# Patient Record
Sex: MALE | Race: WHITE | Employment: OTHER | URBAN - NONMETROPOLITAN AREA
[De-identification: names, ages, dates, MRNs, and addresses within clinical notes are randomized per-mention and may not be internally consistent; named-entity substitution may affect disease eponyms.]

---

## 2017-04-29 ENCOUNTER — HOSPITAL ENCOUNTER (EMERGENCY)
Age: 62
Discharge: HOME | End: 2017-04-29
Attending: FAMILY MEDICINE
Payer: COMMERCIAL

## 2017-04-29 VITALS
DIASTOLIC BLOOD PRESSURE: 68 MMHG | WEIGHT: 225 LBS | HEART RATE: 69 BPM | HEIGHT: 72 IN | RESPIRATION RATE: 20 BRPM | OXYGEN SATURATION: 97 % | SYSTOLIC BLOOD PRESSURE: 122 MMHG | BODY MASS INDEX: 30.48 KG/M2 | TEMPERATURE: 97.9 F

## 2017-04-29 DIAGNOSIS — L03.115 CELLULITIS OF BOTH LOWER EXTREMITIES: Primary | ICD-10-CM

## 2017-04-29 DIAGNOSIS — L03.116 CELLULITIS OF BOTH LOWER EXTREMITIES: Primary | ICD-10-CM

## 2017-04-29 LAB
ABSOLUTE EOS #: 0.1 K/UL (ref 0–0.4)
ABSOLUTE LYMPH #: 1.1 K/UL (ref 0.9–2.5)
ABSOLUTE MONO #: 0.6 K/UL (ref 0–1)
ALBUMIN SERPL-MCNC: 4.1 G/DL (ref 3.5–5.2)
ALBUMIN/GLOBULIN RATIO: ABNORMAL (ref 1–2.5)
ALP BLD-CCNC: 59 U/L (ref 40–129)
ALT SERPL-CCNC: 15 U/L (ref 5–41)
ANION GAP SERPL CALCULATED.3IONS-SCNC: 14 MMOL/L (ref 9–17)
AST SERPL-CCNC: 14 U/L
BASOPHILS # BLD: 0 %
BASOPHILS ABSOLUTE: 0 K/UL (ref 0–0.2)
BILIRUB SERPL-MCNC: 0.25 MG/DL (ref 0.3–1.2)
BUN BLDV-MCNC: 41 MG/DL (ref 8–23)
BUN/CREAT BLD: 23 (ref 9–20)
CALCIUM SERPL-MCNC: 9.5 MG/DL (ref 8.6–10.4)
CHLORIDE BLD-SCNC: 97 MMOL/L (ref 98–107)
CO2: 27 MMOL/L (ref 20–31)
CREAT SERPL-MCNC: 1.79 MG/DL (ref 0.7–1.2)
DIFFERENTIAL TYPE: YES
EOSINOPHILS RELATIVE PERCENT: 1 %
GFR AFRICAN AMERICAN: 47 ML/MIN
GFR NON-AFRICAN AMERICAN: 39 ML/MIN
GFR SERPL CREATININE-BSD FRML MDRD: ABNORMAL ML/MIN/{1.73_M2}
GFR SERPL CREATININE-BSD FRML MDRD: ABNORMAL ML/MIN/{1.73_M2}
GLUCOSE BLD-MCNC: 121 MG/DL (ref 70–99)
HCT VFR BLD CALC: 32.9 % (ref 41–53)
HEMOGLOBIN: 11.4 G/DL (ref 13.5–17.5)
LACTIC ACID, WHOLE BLOOD: NORMAL MMOL/L (ref 0.7–2.1)
LACTIC ACID: 1.1 MMOL/L (ref 0.5–2.2)
LYMPHOCYTES # BLD: 13 %
MCH RBC QN AUTO: 33.4 PG (ref 26–34)
MCHC RBC AUTO-ENTMCNC: 34.8 G/DL (ref 31–37)
MCV RBC AUTO: 96.1 FL (ref 80–100)
MONOCYTES # BLD: 7 %
PDW BLD-RTO: 14.4 % (ref 12.1–15.2)
PLATELET # BLD: 167 K/UL (ref 140–450)
PLATELET ESTIMATE: ABNORMAL
PMV BLD AUTO: ABNORMAL FL (ref 6–12)
POTASSIUM SERPL-SCNC: 4.2 MMOL/L (ref 3.7–5.3)
RBC # BLD: 3.43 M/UL (ref 4.5–5.9)
RBC # BLD: ABNORMAL 10*6/UL
SEG NEUTROPHILS: 79 %
SEGMENTED NEUTROPHILS ABSOLUTE COUNT: 6.7 K/UL (ref 2.1–6.5)
SODIUM BLD-SCNC: 138 MMOL/L (ref 135–144)
TOTAL PROTEIN: 7 G/DL (ref 6.4–8.3)
WBC # BLD: 8.6 K/UL (ref 3.5–11)
WBC # BLD: ABNORMAL 10*3/UL

## 2017-04-29 PROCEDURE — 6360000002 HC RX W HCPCS: Performed by: FAMILY MEDICINE

## 2017-04-29 PROCEDURE — 83605 ASSAY OF LACTIC ACID: CPT

## 2017-04-29 PROCEDURE — 80053 COMPREHEN METABOLIC PANEL: CPT

## 2017-04-29 PROCEDURE — 87070 CULTURE OTHR SPECIMN AEROBIC: CPT

## 2017-04-29 PROCEDURE — 96367 TX/PROPH/DG ADDL SEQ IV INF: CPT

## 2017-04-29 PROCEDURE — 87040 BLOOD CULTURE FOR BACTERIA: CPT

## 2017-04-29 PROCEDURE — 99283 EMERGENCY DEPT VISIT LOW MDM: CPT

## 2017-04-29 PROCEDURE — 2580000003 HC RX 258: Performed by: FAMILY MEDICINE

## 2017-04-29 PROCEDURE — 36415 COLL VENOUS BLD VENIPUNCTURE: CPT

## 2017-04-29 PROCEDURE — 96365 THER/PROPH/DIAG IV INF INIT: CPT

## 2017-04-29 PROCEDURE — 96375 TX/PRO/DX INJ NEW DRUG ADDON: CPT

## 2017-04-29 PROCEDURE — 85025 COMPLETE CBC W/AUTO DIFF WBC: CPT

## 2017-04-29 PROCEDURE — 87205 SMEAR GRAM STAIN: CPT

## 2017-04-29 RX ORDER — HYDROCODONE BITARTRATE AND ACETAMINOPHEN 5; 325 MG/1; MG/1
1 TABLET ORAL EVERY 6 HOURS PRN
Qty: 8 TABLET | Refills: 0 | Status: SHIPPED | OUTPATIENT
Start: 2017-04-29 | End: 2018-04-04 | Stop reason: ALTCHOICE

## 2017-04-29 RX ORDER — SULFAMETHOXAZOLE AND TRIMETHOPRIM 800; 160 MG/1; MG/1
1 TABLET ORAL 2 TIMES DAILY
Qty: 20 TABLET | Refills: 0 | Status: SHIPPED | OUTPATIENT
Start: 2017-04-29 | End: 2017-05-09

## 2017-04-29 RX ORDER — CEPHALEXIN 500 MG/1
500 CAPSULE ORAL 3 TIMES DAILY
Qty: 30 CAPSULE | Refills: 0 | Status: SHIPPED | OUTPATIENT
Start: 2017-04-29 | End: 2018-03-18 | Stop reason: ALTCHOICE

## 2017-04-29 RX ORDER — ONDANSETRON 2 MG/ML
4 INJECTION INTRAMUSCULAR; INTRAVENOUS ONCE
Status: COMPLETED | OUTPATIENT
Start: 2017-04-29 | End: 2017-04-29

## 2017-04-29 RX ADMIN — ONDANSETRON HYDROCHLORIDE 4 MG: 2 INJECTION, SOLUTION INTRAMUSCULAR; INTRAVENOUS at 18:25

## 2017-04-29 RX ADMIN — PIPERACILLIN SODIUM,TAZOBACTAM SODIUM 3.38 G: 3; .375 INJECTION, POWDER, FOR SOLUTION INTRAVENOUS at 20:15

## 2017-04-29 RX ADMIN — HYDROMORPHONE HYDROCHLORIDE 1 MG: 1 INJECTION, SOLUTION INTRAMUSCULAR; INTRAVENOUS; SUBCUTANEOUS at 18:25

## 2017-04-29 RX ADMIN — VANCOMYCIN HYDROCHLORIDE 1000 MG: 1 INJECTION, POWDER, LYOPHILIZED, FOR SOLUTION INTRAVENOUS at 18:58

## 2017-04-29 ASSESSMENT — PAIN DESCRIPTION - PAIN TYPE: TYPE: ACUTE PAIN

## 2017-04-29 ASSESSMENT — PAIN DESCRIPTION - PROGRESSION: CLINICAL_PROGRESSION: NOT CHANGED

## 2017-04-29 ASSESSMENT — PAIN DESCRIPTION - ORIENTATION: ORIENTATION: RIGHT;LEFT;LOWER

## 2017-04-29 ASSESSMENT — PAIN DESCRIPTION - LOCATION: LOCATION: LEG

## 2017-04-29 ASSESSMENT — PAIN DESCRIPTION - DESCRIPTORS: DESCRIPTORS: BURNING;ACHING

## 2017-04-29 ASSESSMENT — PAIN SCALES - GENERAL
PAINLEVEL_OUTOF10: 4
PAINLEVEL_OUTOF10: 6

## 2017-04-29 ASSESSMENT — PAIN DESCRIPTION - FREQUENCY: FREQUENCY: CONTINUOUS

## 2017-05-02 LAB
CULTURE: ABNORMAL
CULTURE: ABNORMAL
DIRECT EXAM: ABNORMAL
DIRECT EXAM: ABNORMAL
Lab: ABNORMAL
SPECIMEN DESCRIPTION: ABNORMAL
SPECIMEN DESCRIPTION: ABNORMAL
STATUS: ABNORMAL

## 2017-05-05 LAB
CULTURE: NORMAL
Lab: NORMAL
Lab: NORMAL
SPECIMEN DESCRIPTION: NORMAL
SPECIMEN DESCRIPTION: NORMAL
STATUS: NORMAL
STATUS: NORMAL

## 2018-03-18 ENCOUNTER — HOSPITAL ENCOUNTER (EMERGENCY)
Age: 63
Discharge: HOME | End: 2018-03-18
Attending: EMERGENCY MEDICINE

## 2018-03-18 VITALS
SYSTOLIC BLOOD PRESSURE: 176 MMHG | TEMPERATURE: 97.4 F | OXYGEN SATURATION: 98 % | RESPIRATION RATE: 20 BRPM | HEART RATE: 72 BPM | DIASTOLIC BLOOD PRESSURE: 82 MMHG

## 2018-03-18 DIAGNOSIS — F10.20 UNCOMPLICATED ALCOHOL DEPENDENCE (HCC): Primary | ICD-10-CM

## 2018-03-18 PROCEDURE — 99283 EMERGENCY DEPT VISIT LOW MDM: CPT

## 2018-03-18 ASSESSMENT — ENCOUNTER SYMPTOMS
DIARRHEA: 0
SHORTNESS OF BREATH: 0
VOMITING: 0
EYE REDNESS: 0
ABDOMINAL PAIN: 0
NAUSEA: 0
BACK PAIN: 0
SORE THROAT: 0
EYE PAIN: 0
COUGH: 0
TROUBLE SWALLOWING: 0
COLOR CHANGE: 0

## 2018-04-04 ENCOUNTER — HOSPITAL ENCOUNTER (EMERGENCY)
Age: 63
Discharge: HOME | End: 2018-04-04
Attending: FAMILY MEDICINE
Payer: MEDICARE

## 2018-04-04 ENCOUNTER — APPOINTMENT (OUTPATIENT)
Dept: GENERAL RADIOLOGY | Age: 63
End: 2018-04-04
Payer: MEDICARE

## 2018-04-04 VITALS
TEMPERATURE: 99.2 F | OXYGEN SATURATION: 93 % | DIASTOLIC BLOOD PRESSURE: 66 MMHG | WEIGHT: 236.9 LBS | SYSTOLIC BLOOD PRESSURE: 129 MMHG | RESPIRATION RATE: 25 BRPM | HEART RATE: 71 BPM | BODY MASS INDEX: 32.13 KG/M2

## 2018-04-04 DIAGNOSIS — J10.1 INFLUENZA B: Primary | ICD-10-CM

## 2018-04-04 DIAGNOSIS — R19.7 DIARRHEA, UNSPECIFIED TYPE: ICD-10-CM

## 2018-04-04 LAB
ABSOLUTE EOS #: ABNORMAL K/UL (ref 0–0.4)
ABSOLUTE IMMATURE GRANULOCYTE: ABNORMAL K/UL (ref 0–0.3)
ABSOLUTE LYMPH #: 0.6 K/UL (ref 1–4.8)
ABSOLUTE MONO #: 0.42 K/UL (ref 0–1)
ALBUMIN SERPL-MCNC: 4.3 G/DL (ref 3.5–5.2)
ALBUMIN/GLOBULIN RATIO: ABNORMAL (ref 1–2.5)
ALP BLD-CCNC: 63 U/L (ref 40–129)
ALT SERPL-CCNC: 17 U/L (ref 5–41)
ANION GAP SERPL CALCULATED.3IONS-SCNC: 16 MMOL/L (ref 9–17)
AST SERPL-CCNC: 25 U/L
BASOPHILS # BLD: ABNORMAL % (ref 0–2)
BASOPHILS ABSOLUTE: ABNORMAL K/UL (ref 0–0.2)
BILIRUB SERPL-MCNC: 0.33 MG/DL (ref 0.3–1.2)
BNP INTERPRETATION: ABNORMAL
BUN BLDV-MCNC: 19 MG/DL (ref 8–23)
BUN/CREAT BLD: 14 (ref 9–20)
CALCIUM SERPL-MCNC: 8.8 MG/DL (ref 8.6–10.4)
CHLORIDE BLD-SCNC: 94 MMOL/L (ref 98–107)
CO2: 26 MMOL/L (ref 20–31)
CREAT SERPL-MCNC: 1.33 MG/DL (ref 0.7–1.2)
DIFFERENTIAL TYPE: ABNORMAL
DIRECT EXAM: ABNORMAL
EKG ATRIAL RATE: 66 BPM
EKG P AXIS: 62 DEGREES
EKG P-R INTERVAL: 154 MS
EKG Q-T INTERVAL: 428 MS
EKG QRS DURATION: 126 MS
EKG QTC CALCULATION (BAZETT): 448 MS
EKG R AXIS: -9 DEGREES
EKG T AXIS: -26 DEGREES
EKG VENTRICULAR RATE: 66 BPM
EOSINOPHILS RELATIVE PERCENT: ABNORMAL % (ref 0–5)
GFR AFRICAN AMERICAN: >60 ML/MIN
GFR NON-AFRICAN AMERICAN: 54 ML/MIN
GFR SERPL CREATININE-BSD FRML MDRD: ABNORMAL ML/MIN/{1.73_M2}
GFR SERPL CREATININE-BSD FRML MDRD: ABNORMAL ML/MIN/{1.73_M2}
GLUCOSE BLD-MCNC: 90 MG/DL (ref 70–99)
HCT VFR BLD CALC: 44.3 % (ref 41–53)
HEMOGLOBIN: 15.3 G/DL (ref 13.5–17.5)
IMMATURE GRANULOCYTES: ABNORMAL %
LYMPHOCYTES # BLD: 23 % (ref 13–44)
Lab: ABNORMAL
MCH RBC QN AUTO: 32.8 PG (ref 26–34)
MCHC RBC AUTO-ENTMCNC: 34.4 G/DL (ref 31–37)
MCV RBC AUTO: 95.2 FL (ref 80–100)
MONOCYTES # BLD: 16 % (ref 5–9)
MORPHOLOGY: ABNORMAL
NRBC AUTOMATED: ABNORMAL PER 100 WBC
PDW BLD-RTO: 15.9 % (ref 12.1–15.2)
PLATELET # BLD: 161 K/UL (ref 140–450)
PLATELET ESTIMATE: ABNORMAL
PMV BLD AUTO: ABNORMAL FL (ref 6–12)
POTASSIUM SERPL-SCNC: 4.4 MMOL/L (ref 3.7–5.3)
PRO-BNP: 447 PG/ML
RBC # BLD: 4.66 M/UL (ref 4.5–5.9)
RBC # BLD: ABNORMAL 10*6/UL
SEG NEUTROPHILS: 61 % (ref 39–75)
SEGMENTED NEUTROPHILS ABSOLUTE COUNT: 1.58 K/UL (ref 2.1–6.5)
SODIUM BLD-SCNC: 136 MMOL/L (ref 135–144)
SPECIMEN DESCRIPTION: ABNORMAL
STATUS: ABNORMAL
TOTAL PROTEIN: 7.5 G/DL (ref 6.4–8.3)
TROPONIN INTERP: NORMAL
TROPONIN T: <0.03 NG/ML
WBC # BLD: 2.6 K/UL (ref 3.5–11)
WBC # BLD: ABNORMAL 10*3/UL

## 2018-04-04 PROCEDURE — 93005 ELECTROCARDIOGRAM TRACING: CPT

## 2018-04-04 PROCEDURE — 80053 COMPREHEN METABOLIC PANEL: CPT

## 2018-04-04 PROCEDURE — 85025 COMPLETE CBC W/AUTO DIFF WBC: CPT

## 2018-04-04 PROCEDURE — 83880 ASSAY OF NATRIURETIC PEPTIDE: CPT

## 2018-04-04 PROCEDURE — 99285 EMERGENCY DEPT VISIT HI MDM: CPT

## 2018-04-04 PROCEDURE — 71046 X-RAY EXAM CHEST 2 VIEWS: CPT

## 2018-04-04 PROCEDURE — 84484 ASSAY OF TROPONIN QUANT: CPT

## 2018-04-04 PROCEDURE — 87804 INFLUENZA ASSAY W/OPTIC: CPT

## 2018-04-04 RX ORDER — DEXTROMETHORPHAN HYDROBROMIDE AND PROMETHAZINE HYDROCHLORIDE 15; 6.25 MG/5ML; MG/5ML
5 SYRUP ORAL 4 TIMES DAILY PRN
Qty: 140 ML | Refills: 0 | Status: SHIPPED | OUTPATIENT
Start: 2018-04-04 | End: 2018-04-11

## 2018-04-04 RX ORDER — OSELTAMIVIR PHOSPHATE 75 MG/1
75 CAPSULE ORAL 2 TIMES DAILY
Qty: 10 CAPSULE | Refills: 0 | Status: SHIPPED | OUTPATIENT
Start: 2018-04-04 | End: 2018-04-09

## 2018-04-04 ASSESSMENT — PAIN DESCRIPTION - FREQUENCY: FREQUENCY: CONTINUOUS

## 2018-04-04 ASSESSMENT — PAIN DESCRIPTION - ONSET: ONSET: ON-GOING

## 2018-04-04 ASSESSMENT — PAIN DESCRIPTION - ORIENTATION: ORIENTATION: RIGHT;LEFT

## 2018-04-04 ASSESSMENT — PAIN DESCRIPTION - PROGRESSION: CLINICAL_PROGRESSION: GRADUALLY WORSENING

## 2018-04-04 ASSESSMENT — PAIN DESCRIPTION - DESCRIPTORS: DESCRIPTORS: CONSTANT

## 2018-04-04 ASSESSMENT — PAIN SCALES - GENERAL: PAINLEVEL_OUTOF10: 7

## 2018-04-04 ASSESSMENT — PAIN DESCRIPTION - LOCATION: LOCATION: HEAD

## 2020-11-07 ENCOUNTER — APPOINTMENT (OUTPATIENT)
Dept: GENERAL RADIOLOGY | Age: 65
End: 2020-11-07
Payer: MEDICARE

## 2020-11-07 ENCOUNTER — HOSPITAL ENCOUNTER (EMERGENCY)
Age: 65
Discharge: HOME | End: 2020-11-07
Attending: FAMILY MEDICINE
Payer: MEDICARE

## 2020-11-07 VITALS
OXYGEN SATURATION: 92 % | SYSTOLIC BLOOD PRESSURE: 131 MMHG | DIASTOLIC BLOOD PRESSURE: 46 MMHG | BODY MASS INDEX: 34.99 KG/M2 | RESPIRATION RATE: 20 BRPM | WEIGHT: 258 LBS | HEART RATE: 62 BPM | TEMPERATURE: 97.7 F

## 2020-11-07 LAB
ABSOLUTE EOS #: 0.2 K/UL (ref 0–0.4)
ABSOLUTE IMMATURE GRANULOCYTE: ABNORMAL K/UL (ref 0–0.3)
ABSOLUTE LYMPH #: 1.3 K/UL (ref 1–4.8)
ABSOLUTE MONO #: 1.1 K/UL (ref 0–1)
ALBUMIN SERPL-MCNC: 3.8 G/DL (ref 3.5–5.2)
ALBUMIN/GLOBULIN RATIO: ABNORMAL (ref 1–2.5)
ALP BLD-CCNC: 77 U/L (ref 40–129)
ALT SERPL-CCNC: 11 U/L (ref 5–41)
ANION GAP SERPL CALCULATED.3IONS-SCNC: 10 MMOL/L (ref 9–17)
AST SERPL-CCNC: 12 U/L
BASOPHILS # BLD: 0 % (ref 0–2)
BASOPHILS ABSOLUTE: 0 K/UL (ref 0–0.2)
BILIRUB SERPL-MCNC: 0.44 MG/DL (ref 0.3–1.2)
BUN BLDV-MCNC: 25 MG/DL (ref 8–23)
BUN/CREAT BLD: 16 (ref 9–20)
CALCIUM SERPL-MCNC: 9.1 MG/DL (ref 8.6–10.4)
CHLORIDE BLD-SCNC: 95 MMOL/L (ref 98–107)
CO2: 27 MMOL/L (ref 20–31)
CREAT SERPL-MCNC: 1.55 MG/DL (ref 0.7–1.2)
DIFFERENTIAL TYPE: YES
EOSINOPHILS RELATIVE PERCENT: 2 % (ref 0–5)
GFR AFRICAN AMERICAN: 55 ML/MIN
GFR NON-AFRICAN AMERICAN: 45 ML/MIN
GFR SERPL CREATININE-BSD FRML MDRD: ABNORMAL ML/MIN/{1.73_M2}
GFR SERPL CREATININE-BSD FRML MDRD: ABNORMAL ML/MIN/{1.73_M2}
GLUCOSE BLD-MCNC: 114 MG/DL (ref 70–99)
HCT VFR BLD CALC: 35.4 % (ref 41–53)
HEMOGLOBIN: 12.1 G/DL (ref 13.5–17.5)
IMMATURE GRANULOCYTES: ABNORMAL %
LYMPHOCYTES # BLD: 15 % (ref 13–44)
MCH RBC QN AUTO: 33.4 PG (ref 26–34)
MCHC RBC AUTO-ENTMCNC: 34.2 G/DL (ref 31–37)
MCV RBC AUTO: 97.8 FL (ref 80–100)
MONOCYTES # BLD: 12 % (ref 5–9)
NRBC AUTOMATED: ABNORMAL PER 100 WBC
PDW BLD-RTO: 15.1 % (ref 12.1–15.2)
PLATELET # BLD: 162 K/UL (ref 140–450)
PLATELET ESTIMATE: ABNORMAL
PMV BLD AUTO: ABNORMAL FL (ref 6–12)
POTASSIUM SERPL-SCNC: 4.4 MMOL/L (ref 3.7–5.3)
RBC # BLD: 3.62 M/UL (ref 4.5–5.9)
RBC # BLD: ABNORMAL 10*6/UL
SEG NEUTROPHILS: 71 % (ref 39–75)
SEGMENTED NEUTROPHILS ABSOLUTE COUNT: 6.3 K/UL (ref 2.1–6.5)
SODIUM BLD-SCNC: 132 MMOL/L (ref 135–144)
TOTAL PROTEIN: 7 G/DL (ref 6.4–8.3)
URIC ACID: 9.7 MG/DL (ref 3.4–7)
WBC # BLD: 8.8 K/UL (ref 3.5–11)
WBC # BLD: ABNORMAL 10*3/UL

## 2020-11-07 PROCEDURE — 36415 COLL VENOUS BLD VENIPUNCTURE: CPT

## 2020-11-07 PROCEDURE — 6370000000 HC RX 637 (ALT 250 FOR IP): Performed by: FAMILY MEDICINE

## 2020-11-07 PROCEDURE — 99284 EMERGENCY DEPT VISIT MOD MDM: CPT

## 2020-11-07 PROCEDURE — 80053 COMPREHEN METABOLIC PANEL: CPT

## 2020-11-07 PROCEDURE — 73110 X-RAY EXAM OF WRIST: CPT

## 2020-11-07 PROCEDURE — 84550 ASSAY OF BLOOD/URIC ACID: CPT

## 2020-11-07 PROCEDURE — 73130 X-RAY EXAM OF HAND: CPT

## 2020-11-07 PROCEDURE — 85025 COMPLETE CBC W/AUTO DIFF WBC: CPT

## 2020-11-07 RX ORDER — COLCHICINE 0.6 MG/1
CAPSULE ORAL
Qty: 8 CAPSULE | Refills: 0 | Status: SHIPPED | OUTPATIENT
Start: 2020-11-07

## 2020-11-07 RX ORDER — OXYCODONE HYDROCHLORIDE AND ACETAMINOPHEN 5; 325 MG/1; MG/1
1 TABLET ORAL EVERY 6 HOURS PRN
Qty: 12 TABLET | Refills: 0 | Status: SHIPPED | OUTPATIENT
Start: 2020-11-07 | End: 2020-11-10

## 2020-11-07 RX ORDER — OXYCODONE HYDROCHLORIDE AND ACETAMINOPHEN 5; 325 MG/1; MG/1
2 TABLET ORAL ONCE
Status: COMPLETED | OUTPATIENT
Start: 2020-11-07 | End: 2020-11-07

## 2020-11-07 RX ADMIN — OXYCODONE HYDROCHLORIDE AND ACETAMINOPHEN 2 TABLET: 5; 325 TABLET ORAL at 18:47

## 2020-11-07 ASSESSMENT — PAIN SCALES - GENERAL
PAINLEVEL_OUTOF10: 10
PAINLEVEL_OUTOF10: 10

## 2020-11-07 ASSESSMENT — PAIN DESCRIPTION - PAIN TYPE: TYPE: ACUTE PAIN

## 2020-11-07 ASSESSMENT — PAIN DESCRIPTION - DESCRIPTORS: DESCRIPTORS: ACHING

## 2020-11-07 ASSESSMENT — PAIN DESCRIPTION - ORIENTATION: ORIENTATION: LEFT

## 2020-11-07 ASSESSMENT — PAIN DESCRIPTION - LOCATION: LOCATION: WRIST

## 2020-11-07 ASSESSMENT — PAIN DESCRIPTION - FREQUENCY: FREQUENCY: CONTINUOUS

## 2020-11-08 NOTE — ED PROVIDER NOTES
eMERGENCY dEPARTMENT eNCOUnter        [facility]    Chief Complaint   Patient presents with    Wrist Pain     left wrist pain x2 days- denies any injury        HPI    Teddy Turk is a 60 y.o. male who presents with severe pain of his left wrist for 2 days. He is also had swelling of his left hand. Patient has a history of gout. REVIEW OF SYSTEMS    All systems reviewed and positives are in the HPI. PAST MEDICAL HISTORY    Past Medical History:   Diagnosis Date    Atrial fibrillation ([facility]     CAD (coronary artery disease)     Cancer ([facility]     sqamous cell carcinoma    CHF (congestive heart failure) (HCC)     Chronic kidney disease     Hyperlipidemia     Hypertension     Pneumonia        SURGICAL HISTORY    Past Surgical History:   Procedure Laterality Date    CARDIAC DEFIBRILLATOR PLACEMENT      FEMUR FRACTURE SURGERY      age 24   [de-identified] LEG SURGERY      stents in both legs    PACEMAKER INSERTION  1999       CURRENT MEDICATIONS    Current Outpatient Rx   Medication Sig Dispense Refill    apixaban (ELIQUIS) 5 MG TABS tablet Take 1 tablet by mouth 2 times daily      colchicine (MITIGARE) 0.6 MG capsule 2 tablets initially, then 1 tablet 1 hour later. Wait 12 hours then take 1 tablet daily 8 capsule 0    oxyCODONE-acetaminophen (PERCOCET) 5-325 MG per tablet Take 1 tablet by mouth every 6 hours as needed for Pain for up to 3 days. Intended supply: 3 days.  Take lowest dose possible to manage pain 12 tablet 0    aspirin 81 MG tablet Take 81 mg by mouth daily      enalapril (VASOTEC) 10 MG tablet Take 20 mg by mouth 2 times daily      isosorbide mononitrate (IMDUR) 30 MG extended release tablet Take 30 mg by mouth daily      furosemide (LASIX) 40 MG tablet Take 40 mg by mouth 2 times daily       simvastatin (ZOCOR) 10 MG tablet Take 10 mg by mouth nightly      Elastic Bandages & Supports (MEDICAL COMPRESSION STOCKINGS) MISC 1 each by Does not apply route daily 1 each 0       ALLERGIES    No Known Allergies    FAMILY HISTORY    History reviewed. No pertinent family history. SOCIAL HISTORY    Social History     Socioeconomic History    Marital status:      Spouse name: None    Number of children: None    Years of education: None    Highest education level: None   Occupational History    None   Social Needs    Financial resource strain: None    Food insecurity     Worry: None     Inability: None    Transportation needs     Medical: None     Non-medical: None   Tobacco Use    Smoking status: Former Smoker     Packs/day: 0.50     Types: Cigarettes    Smokeless tobacco: Never Used   Substance and Sexual Activity    Alcohol use: Not Currently    Drug use: No    Sexual activity: None   Lifestyle    Physical activity     Days per week: None     Minutes per session: None    Stress: None   Relationships    Social connections     Talks on phone: None     Gets together: None     Attends [employer] service: None     Active member of club or organization: None     Attends meetings of clubs or organizations: None     Relationship status: None    Intimate partner violence     Fear of current or ex partner: None     Emotionally abused: None     Physically abused: None     Forced sexual activity: None   Other Topics Concern    None   Social History Narrative    None       PHYSICAL EXAM    VITAL SIGNS: BP (!) 131/46   Pulse 62   Temp 97.7 °F (36.5 °C) (Oral)   Resp 20   Wt 258 lb (117 kg)   SpO2 92%   BMI 34.99 kg/m²   Constitutional:  Well developed, well nourished, severe acute distress, non-toxic appearance   HENT:  Atraumatic, external ears normal, nose normal, oropharynx moist.  Neck- normal range of motion, no tenderness, supple   Respiratory:  No respiratory distress, normal breath sounds.    Cardiovascular:  Normal rate, normal rhythm, no murmurs, no gallops, no rubs   GI:  Soft, nondistended, normal bowel sounds, nontender   Musculoskeletal: Tenderness and swelling left wrist. Tenderness left hand. Creased range of motion due to pain. Integument:  Well hydrated, no rash   Neurologic: Grossly intact    RADIOLOGY/PROCEDURES    No acute fracture or traumatic malalignment of left wrist.  Soft tissue swelling of the dorsum of the hand may correlate with cellulitis, lymphedema or venous stasis. No acute fracture is seen. Chronic deformities of the ulnar styloid process and third distal phalanx. ED COURSE & MEDICAL DECISION MAKING    Pertinent Labs & Imaging studies reviewed. (See chart for details)  Labs Reviewed   URIC ACID - Abnormal; Notable for the following components:       Result Value    Uric Acid 9.7 (*)     All other components within normal limits   COMPREHENSIVE METABOLIC PANEL - Abnormal; Notable for the following components:    Glucose 114 (*)     BUN 25 (*)     CREATININE 1.55 (*)     Sodium 132 (*)     Chloride 95 (*)     GFR Non- 45 (*)     GFR  55 (*)     All other components within normal limits   CBC WITH AUTO DIFFERENTIAL - Abnormal; Notable for the following components:    RBC 3.62 (*)     Hemoglobin 12.1 (*)     Hematocrit 35.4 (*)     Monocytes 12 (*)     Absolute Mono # 1.10 (*)     All other components within normal limits     Treat acute gout attack with colchicine and Percocet. Patient was stable on discharge. FINAL IMPRESSION    1. Acute gout attack  2. Summation      Patient Course: Patient was stable on discharge. ED Medications administered this visit:    Medications   oxyCODONE-acetaminophen (PERCOCET) 5-325 MG per tablet 2 tablet (2 tablets Oral Given 11/7/20 1847)       New Prescriptions from this visit:    Discharge Medication List as of 11/7/2020  6:43 PM      START taking these medications    Details   colchicine (MITIGARE) 0.6 MG capsule 2 tablets initially, then 1 tablet 1 hour later.   Wait 12 hours then take 1 tablet daily, Disp-8 capsule,R-0Print      oxyCODONE-acetaminophen (PERCOCET) 5-325 MG per

## 2022-02-07 ENCOUNTER — HOSPITAL ENCOUNTER (EMERGENCY)
Age: 67
Discharge: HOME | End: 2022-02-07
Attending: EMERGENCY MEDICINE
Payer: MEDICARE

## 2022-02-07 VITALS
OXYGEN SATURATION: 96 % | TEMPERATURE: 98.6 F | DIASTOLIC BLOOD PRESSURE: 65 MMHG | SYSTOLIC BLOOD PRESSURE: 127 MMHG | WEIGHT: 260 LBS | HEART RATE: 69 BPM | BODY MASS INDEX: 35.21 KG/M2 | HEIGHT: 72 IN | RESPIRATION RATE: 16 BRPM

## 2022-02-07 DIAGNOSIS — M79.89 LEFT ARM SWELLING: Primary | ICD-10-CM

## 2022-02-07 DIAGNOSIS — L03.114 CELLULITIS OF LEFT UPPER EXTREMITY: ICD-10-CM

## 2022-02-07 LAB
ABSOLUTE EOS #: 0.1 K/UL (ref 0–0.4)
ABSOLUTE IMMATURE GRANULOCYTE: ABNORMAL K/UL (ref 0–0.3)
ABSOLUTE LYMPH #: 1.2 K/UL (ref 1–4.8)
ABSOLUTE MONO #: 0.9 K/UL (ref 0–1)
ALBUMIN SERPL-MCNC: 3.9 G/DL (ref 3.5–5.2)
ALBUMIN/GLOBULIN RATIO: ABNORMAL (ref 1–2.5)
ALP BLD-CCNC: 73 U/L (ref 40–129)
ALT SERPL-CCNC: 14 U/L (ref 5–41)
ANION GAP SERPL CALCULATED.3IONS-SCNC: 10 MMOL/L (ref 9–17)
AST SERPL-CCNC: 12 U/L
BASOPHILS # BLD: 0 % (ref 0–2)
BASOPHILS ABSOLUTE: 0 K/UL (ref 0–0.2)
BILIRUB SERPL-MCNC: 0.67 MG/DL (ref 0.3–1.2)
BUN BLDV-MCNC: 19 MG/DL (ref 8–23)
BUN/CREAT BLD: 18 (ref 9–20)
C-REACTIVE PROTEIN: 104.4 MG/L (ref 0–5)
CALCIUM SERPL-MCNC: 9.1 MG/DL (ref 8.6–10.4)
CHLORIDE BLD-SCNC: 95 MMOL/L (ref 98–107)
CO2: 30 MMOL/L (ref 20–31)
CREAT SERPL-MCNC: 1.07 MG/DL (ref 0.7–1.2)
D-DIMER QUANTITATIVE: 1.68 MG/L FEU (ref 0–0.59)
DIFFERENTIAL TYPE: YES
EOSINOPHILS RELATIVE PERCENT: 1 % (ref 0–5)
GFR AFRICAN AMERICAN: >60 ML/MIN
GFR NON-AFRICAN AMERICAN: >60 ML/MIN
GFR SERPL CREATININE-BSD FRML MDRD: ABNORMAL ML/MIN/{1.73_M2}
GFR SERPL CREATININE-BSD FRML MDRD: ABNORMAL ML/MIN/{1.73_M2}
GLUCOSE BLD-MCNC: 105 MG/DL (ref 70–99)
HCT VFR BLD CALC: 36.1 % (ref 41–53)
HEMOGLOBIN: 12.3 G/DL (ref 13.5–17.5)
IMMATURE GRANULOCYTES: ABNORMAL %
LYMPHOCYTES # BLD: 16 % (ref 13–44)
MCH RBC QN AUTO: 32.8 PG (ref 26–34)
MCHC RBC AUTO-ENTMCNC: 34.1 G/DL (ref 31–37)
MCV RBC AUTO: 96.3 FL (ref 80–100)
MONOCYTES # BLD: 11 % (ref 5–9)
NRBC AUTOMATED: ABNORMAL PER 100 WBC
PDW BLD-RTO: 15 % (ref 12.1–15.2)
PLATELET # BLD: 194 K/UL (ref 140–450)
PLATELET ESTIMATE: ABNORMAL
PMV BLD AUTO: ABNORMAL FL (ref 6–12)
POTASSIUM SERPL-SCNC: 4.1 MMOL/L (ref 3.7–5.3)
RBC # BLD: 3.75 M/UL (ref 4.5–5.9)
RBC # BLD: ABNORMAL 10*6/UL
SEG NEUTROPHILS: 72 % (ref 39–75)
SEGMENTED NEUTROPHILS ABSOLUTE COUNT: 5.5 K/UL (ref 2.1–6.5)
SODIUM BLD-SCNC: 135 MMOL/L (ref 135–144)
TOTAL PROTEIN: 7.3 G/DL (ref 6.4–8.3)
URIC ACID: 9.2 MG/DL (ref 3.4–7)
WBC # BLD: 7.7 K/UL (ref 3.5–11)
WBC # BLD: ABNORMAL 10*3/UL

## 2022-02-07 PROCEDURE — 99283 EMERGENCY DEPT VISIT LOW MDM: CPT

## 2022-02-07 PROCEDURE — 85379 FIBRIN DEGRADATION QUANT: CPT

## 2022-02-07 PROCEDURE — 86140 C-REACTIVE PROTEIN: CPT

## 2022-02-07 PROCEDURE — 84550 ASSAY OF BLOOD/URIC ACID: CPT

## 2022-02-07 PROCEDURE — 80053 COMPREHEN METABOLIC PANEL: CPT

## 2022-02-07 PROCEDURE — 85025 COMPLETE CBC W/AUTO DIFF WBC: CPT

## 2022-02-07 RX ORDER — SULFAMETHOXAZOLE AND TRIMETHOPRIM 800; 160 MG/1; MG/1
1 TABLET ORAL 2 TIMES DAILY
Qty: 14 TABLET | Refills: 0 | Status: SHIPPED | OUTPATIENT
Start: 2022-02-07 | End: 2022-02-14

## 2022-02-07 RX ORDER — CLOPIDOGREL BISULFATE 75 MG/1
75 TABLET ORAL DAILY
COMMUNITY

## 2022-02-07 RX ORDER — VALSARTAN 160 MG/1
160 TABLET ORAL DAILY
COMMUNITY
End: 2022-09-14

## 2022-02-07 RX ORDER — SPIRONOLACTONE 25 MG/1
12.5 TABLET ORAL DAILY
COMMUNITY

## 2022-02-07 RX ORDER — CEPHALEXIN 500 MG/1
500 CAPSULE ORAL 4 TIMES DAILY
Qty: 28 CAPSULE | Refills: 0 | Status: SHIPPED | OUTPATIENT
Start: 2022-02-07 | End: 2022-09-14

## 2022-02-07 RX ORDER — HYDROCODONE BITARTRATE AND ACETAMINOPHEN 5; 325 MG/1; MG/1
1 TABLET ORAL EVERY 6 HOURS PRN
Qty: 15 TABLET | Refills: 0 | Status: SHIPPED | OUTPATIENT
Start: 2022-02-07 | End: 2022-02-12

## 2022-02-07 ASSESSMENT — PAIN DESCRIPTION - PAIN TYPE: TYPE: ACUTE PAIN

## 2022-02-07 ASSESSMENT — PAIN DESCRIPTION - ORIENTATION: ORIENTATION: LEFT

## 2022-02-07 ASSESSMENT — PAIN DESCRIPTION - FREQUENCY: FREQUENCY: CONTINUOUS

## 2022-02-07 ASSESSMENT — PAIN DESCRIPTION - DESCRIPTORS: DESCRIPTORS: POUNDING

## 2022-02-07 ASSESSMENT — PAIN DESCRIPTION - LOCATION: LOCATION: ARM

## 2022-02-07 ASSESSMENT — PAIN SCALES - GENERAL: PAINLEVEL_OUTOF10: 10

## 2022-02-07 NOTE — ED PROVIDER NOTES
[facility] ED  [address] [place], [address]  Phone: [facility]    Chief Complaint   Patient presents with    Arm Swelling     Patient arrives to ER today with complaints of left arm swelling and pain x 5 weeks. Pt reports hx of DVT and is on eliquis and plavix. NAFISA Bee is a 78 y.o. male who presents above-noted complaint. Patient had worsening arm pain and discomfort is been going on for about a month. Been treated for possible gout. Been treated for possible angioedema and he continues some swelling and concerned. he had some intermittent episodes of this in the past. Denies new symptoms fall trauma or injury. Has had more pain and swelling to the area. PAST MEDICAL HISTORY    Past Medical History:   Diagnosis Date    Atrial fibrillation ([facility]     CAD (coronary artery disease)     Cancer ([facility] [address] [facility]     sqamous cell carcinoma    CHF (congestive heart failure) (HCC)     Chronic kidney disease     Hyperlipidemia     Hypertension     Pneumonia        SURGICAL HISTORY    Past Surgical History:   Procedure Laterality Date    CARDIAC DEFIBRILLATOR PLACEMENT      FEMUR FRACTURE SURGERY      age 24   [facility] LEG SURGERY      stents in both legs    PACEMAKER INSERTION  1999       CURRENT MEDICATIONS    Current Outpatient Rx   Medication Sig Dispense Refill    valsartan (DIOVAN) 160 MG tablet Take 160 mg by mouth daily      clopidogrel (PLAVIX) 75 MG tablet Take 75 mg by mouth daily      spironolactone (ALDACTONE) 25 MG tablet Take 12.5 mg by mouth daily      CARVEDILOL PO Take by mouth Unsure of dosage      HYDROcodone-acetaminophen (NORCO) 5-325 MG per tablet Take 1 tablet by mouth every 6 hours as needed for Pain for up to 5 days.  15 tablet 0    sulfamethoxazole-trimethoprim (BACTRIM DS) 800-160 MG per tablet Take 1 tablet by mouth 2 times daily for 7 days 14 tablet 0    cephALEXin (KEFLEX) 500 MG capsule Take 1 capsule by mouth 4 times daily 28 capsule 0    apixaban (ELIQUIS) 5 MG TABS tablet Take 1 tablet by mouth 2 times daily      colchicine (MITIGARE) 0.6 MG capsule 2 tablets initially, then 1 tablet 1 hour later. Wait 12 hours then take 1 tablet daily 8 capsule 0    isosorbide mononitrate (IMDUR) 30 MG extended release tablet Take 30 mg by mouth daily      furosemide (LASIX) 40 MG tablet Take 40 mg by mouth 3 times daily       simvastatin (ZOCOR) 10 MG tablet Take 40 mg by mouth nightly       Elastic Bandages & Supports (MEDICAL COMPRESSION STOCKINGS) MISC 1 each by Does not apply route daily 1 each 0       ALLERGIES    No Known Allergies    FAMILY HISTORY    No family history on file. SOCIAL HISTORY    Social History     Socioeconomic History    Marital status:      Spouse name: Not on file    Number of children: Not on file    Years of education: Not on file    Highest education level: Not on file   Occupational History    Not on file   Tobacco Use    Smoking status: Former Smoker     Packs/day: 0.50     Types: Cigarettes    Smokeless tobacco: Never Used   Vaping Use    Vaping Use: Never used   Substance and Sexual Activity    Alcohol use: Not Currently    Drug use: No    Sexual activity: Not on file   Other Topics Concern    Not on file   Social History Narrative    Not on file     Social Determinants of Health     Financial Resource Strain:     Difficulty of Paying Living Expenses: Not on file   Food Insecurity:     Worried About [address] in the Last Year: Not on file    [address] in the Last Year: Not on file   Transportation Needs:     Lack of Transportation (Medical): Not on file    Lack of Transportation (Non-Medical):  Not on file   Physical Activity:     Days of Exercise per Week: Not on file    Minutes of Exercise per Session: Not on file   Stress:     Feeling of Stress : Not on file   Social Connections:     Frequency of Communication with Friends and Family: Not on file    Frequency of Social Gatherings with Friends and Family: Not on file    Attends Rastafari Services: Not on file    Active Member of Clubs or Organizations: Not on file    Attends Club or Organization Meetings: Not on file    Marital Status: Not on file   Intimate Partner Violence:     Fear of Current or Ex-Partner: Not on file    Emotionally Abused: Not on file    Physically Abused: Not on file    Sexually Abused: Not on file   Housing Stability:     Unable to Pay for Housing in the Last Year: Not on file    Number of [place] in the Last Year: Not on file    Unstable Housing in the Last Year: Not on file       REVIEW OF SYSTEMS    Positive arm pain and swelling. No chest pain or shortness of breath. No weakness. All systems negative except as marked. PHYSICAL EXAM    VITAL SIGNS: /65   Pulse 69   Temp 98.6 °F (37 °C) (Oral)   Resp 16   Ht 6' (1.829 m)   Wt 260 lb (117.9 kg)   SpO2 96%   BMI 35.26 kg/m²    Constitutional:  Alert not toxtic or ill, able give coherent history  HENT:  Normocephalic, Atraumatic  Cervical Spine: Normal range of motion,  No stridor. Eyes:  No discharge or  Swelling  Respiratory: No respiratory distress  Musculoskeletal:  Intact distal pulses, left elbow bursa swelling with some erythema. Left forearm and hand swelling as well. No petechia bullae's or blebs. Passive range of motion is stable. Radial pulses normal edema, No tenderness, No cyanosis, No clubbing. . No tenderness to palpation or major deformities noted.    Integument:  Warm, Dry, No erythema, No rash (on exposed areas)   Neurologic:  Alert & appropriate   Psychiatric:  Affect normal    EKG                      RADIOLOGY    VL DUP UPPER EXTREMITY VENOUS LEFT    (Results Pending)           SCREENINGS  /65   Pulse 69   Temp 98.6 °F (37 °C) (Oral)   Resp 16   Ht 6' (1.829 m)   Wt 260 lb (117.9 kg)   SpO2 96%   BMI 35.26 kg/m²      VL DUP UPPER EXTREMITY VENOUS LEFT    (Results Pending)       Screening For Hypertension and Follow-up (#317)   previously diagnosed with hypertension and not applicable for screen      Screening For Tobacco Use and Cessation Intervention (#226):   reports that he has quit smoking. His smoking use included cigarettes. He smoked 0.50 packs per day. He has never used smokeless tobacco.  Non-smoker not applicable for screen          PROCEDURES    none      CONSULTS:  None        ED COURSE & MEDICAL DECISION MAKING    Pertinent Labs & Imaging studies reviewed. (See chart for details)  Presents with left arm swelling and discomfort. No other associate symptoms such as high fever chills. Does have a little increased warmth to it. Looks like a bursitis on the left elbow although he is got chronic bursa changes on the right as well. He is on blood thinners. Could even be some spontaneous bleeding. Got a good pulse and I do not think this is arterial thrombosis although DVT is still in the differential. He has a history of atrial fib as well. I do not feel there is an acute clot on the arterial side although in the differential. Checking routine labs uric acid etc. CBC. Given some increased warmth I think he would benefit from some antibiotics. If not had vascular studies would be wise to obtain outpatient. Checking for D-dimer currently. REASSESSMENT  4:54 PM  Patient rechecked and updated on lab/xray status, progress and results. Patient was reassessed and condition was unchanged after no treatment . Needs nothing else at this time. Labs do show an elevated D-dimer and CRP. Concern for the more likely cellulitic changes at this time. I feel he likely has some chronic lymphedema that gets flared up every now and then with cellulitis.   He has been diagnosed with swelling in the past.  He states he has had a negative Doppler ultrasound and CAT scans in the past.  And then repeat a Doppler ultrasound given elevated D-dimer although he is already on blood thinners. I counseled him in regards to need for close follow-up evaluation by PCP. Get a cover him with antibiotics. FINAL IMPRESSION    1. Left arm swelling    2. Cellulitis of left upper extremity         PATIENT REFERRED TO:  [address] [place] [address]  [phone]    Call   For evaluation      DISCHARGE MEDICATIONS:  Discharge Medication List as of 2/7/2022  5:36 PM      START taking these medications    Details   HYDROcodone-acetaminophen (NORCO) 5-325 MG per tablet Take 1 tablet by mouth every 6 hours as needed for Pain for up to 5 days. , Disp-15 tablet, R-0Normal      sulfamethoxazole-trimethoprim (BACTRIM DS) 800-160 MG per tablet Take 1 tablet by mouth 2 times daily for 7 days, Disp-14 tablet, R-0Normal      cephALEXin (KEFLEX) 500 MG capsule Take 1 capsule by mouth 4 times daily, Disp-28 capsule, R-0Normal                 Thad Whyte MD  02/07/22 2645

## 2022-09-14 ENCOUNTER — APPOINTMENT (OUTPATIENT)
Dept: GENERAL RADIOLOGY | Age: 67
End: 2022-09-14
Payer: MEDICARE

## 2022-09-14 ENCOUNTER — HOSPITAL ENCOUNTER (EMERGENCY)
Age: 67
Discharge: HOME | End: 2022-09-14
Attending: FAMILY MEDICINE
Payer: MEDICARE

## 2022-09-14 VITALS
WEIGHT: 270 LBS | TEMPERATURE: 97.9 F | HEIGHT: 72 IN | RESPIRATION RATE: 20 BRPM | OXYGEN SATURATION: 94 % | BODY MASS INDEX: 36.57 KG/M2 | HEART RATE: 67 BPM | DIASTOLIC BLOOD PRESSURE: 67 MMHG | SYSTOLIC BLOOD PRESSURE: 123 MMHG

## 2022-09-14 DIAGNOSIS — M54.31 SCIATICA OF RIGHT SIDE: Primary | ICD-10-CM

## 2022-09-14 DIAGNOSIS — M70.41 PREPATELLAR BURSITIS, RIGHT KNEE: ICD-10-CM

## 2022-09-14 PROCEDURE — 99283 EMERGENCY DEPT VISIT LOW MDM: CPT

## 2022-09-14 PROCEDURE — 73564 X-RAY EXAM KNEE 4 OR MORE: CPT

## 2022-09-14 PROCEDURE — 6370000000 HC RX 637 (ALT 250 FOR IP): Performed by: FAMILY MEDICINE

## 2022-09-14 RX ORDER — HYDROCODONE BITARTRATE AND ACETAMINOPHEN 5; 325 MG/1; MG/1
1 TABLET ORAL EVERY 6 HOURS PRN
Qty: 12 TABLET | Refills: 0 | Status: SHIPPED | OUTPATIENT
Start: 2022-09-14 | End: 2022-09-17

## 2022-09-14 RX ORDER — HYDROCODONE BITARTRATE AND ACETAMINOPHEN 5; 325 MG/1; MG/1
1 TABLET ORAL ONCE
Status: COMPLETED | OUTPATIENT
Start: 2022-09-14 | End: 2022-09-14

## 2022-09-14 RX ORDER — BUMETANIDE 1 MG/1
1 TABLET ORAL DAILY
COMMUNITY

## 2022-09-14 RX ORDER — ONDANSETRON 4 MG/1
4 TABLET, ORALLY DISINTEGRATING ORAL ONCE
Status: COMPLETED | OUTPATIENT
Start: 2022-09-14 | End: 2022-09-14

## 2022-09-14 RX ORDER — SACUBITRIL AND VALSARTAN 49; 51 MG/1; MG/1
TABLET, FILM COATED ORAL
COMMUNITY

## 2022-09-14 RX ORDER — NITROGLYCERIN 0.4 MG/1
TABLET SUBLINGUAL
COMMUNITY

## 2022-09-14 RX ORDER — PREDNISONE 20 MG/1
60 TABLET ORAL DAILY
Qty: 15 TABLET | Refills: 0 | Status: SHIPPED | OUTPATIENT
Start: 2022-09-14 | End: 2022-09-19

## 2022-09-14 RX ADMIN — HYDROCODONE BITARTRATE AND ACETAMINOPHEN 1 TABLET: 5; 325 TABLET ORAL at 13:13

## 2022-09-14 RX ADMIN — ONDANSETRON 4 MG: 4 TABLET, ORALLY DISINTEGRATING ORAL at 13:13

## 2022-09-14 ASSESSMENT — PAIN DESCRIPTION - PAIN TYPE: TYPE: ACUTE PAIN

## 2022-09-14 ASSESSMENT — PAIN SCALES - GENERAL
PAINLEVEL_OUTOF10: 10
PAINLEVEL_OUTOF10: 10

## 2022-09-14 ASSESSMENT — PAIN - FUNCTIONAL ASSESSMENT: PAIN_FUNCTIONAL_ASSESSMENT: 0-10

## 2022-09-14 ASSESSMENT — PAIN DESCRIPTION - LOCATION
LOCATION: KNEE
LOCATION: KNEE

## 2022-09-14 ASSESSMENT — PAIN DESCRIPTION - ORIENTATION
ORIENTATION: RIGHT
ORIENTATION: RIGHT

## 2022-09-14 ASSESSMENT — PAIN DESCRIPTION - DESCRIPTORS
DESCRIPTORS: SHARP
DESCRIPTORS: SHARP;SHOOTING;GNAWING

## 2022-09-15 NOTE — ED PROVIDER NOTES
[address] [facility]  eMERGENCY dEPARTMENT eNCOUnter          [facility]       Chief Complaint   Patient presents with    Knee Pain     Right knee pain for 1 week. Unable to keep still and has to change positions frequently. Nurses Notes reviewed and I agree except as noted in the HPI. HISTORY OF PRESENT ILLNESS    Sweta Moreno is a 78 y.o. male who presents the emergency room via private vehicle, patient complaining of pain in his right knee, the states he did bump it a few days ago and has been having COVID pain since that time, some swelling, is concerned that he potentially could have broken the knee. Patient denies other injury. She rates the pain 10 out of 10. Nothing is helped his symptoms. REVIEW OF SYSTEMS     Review of Systems   All other systems reviewed and are negative. PAST MEDICAL HISTORY    has a past medical history of Atrial fibrillation ([address] CAD (coronary artery disease), Cancer ([address] CHF (congestive heart failure) ([facility] Chronic kidney disease, Hyperlipidemia, Hypertension, and Pneumonia. SURGICAL HISTORY      has a past surgical history that includes Pacemaker insertion; Cardiac defibrillator placement; Leg Surgery; and Femur fracture surgery.     CURRENT MEDICATIONS       Discharge Medication List as of 9/14/2022  2:34 PM        CONTINUE these medications which have NOT CHANGED    Details   bumetanide (BUMEX) 1 MG tablet Take 1 mg by mouth daily Take four pills a dayHistorical Med      sacubitril-valsartan (ENTRESTO) 49-51 MG per tablet Entresto 49 mg-51 mg tablet   take 1 tablet by mouth twice a dayHistorical Med      nitroGLYCERIN (NITROSTAT) 0.4 MG SL tablet nitroglycerin 0.4 mg sublingual tablet   place 1 tablet under the tongue and ALLOW to dissolve if neededHistorical Med      fluticasone-umeclidin-vilant (TRELEGY ELLIPTA) 100-62.5-25 MCG/INH AEPB inhale 1 puff by mouth and INTO THE LUNGS once dailyHistorical Med      clopidogrel (PLAVIX) 75 MG tablet Take 75 mg by mouth dailyHistorical Med      spironolactone (ALDACTONE) 25 MG tablet Take 12.5 mg by mouth dailyHistorical Med      CARVEDILOL PO Take by mouth Unsure of dosageHistorical Med      apixaban (ELIQUIS) 5 MG TABS tablet Take 1 tablet by mouth 2 times dailyHistorical Med      colchicine (MITIGARE) 0.6 MG capsule 2 tablets initially, then 1 tablet 1 hour later. Wait 12 hours then take 1 tablet daily, Disp-8 capsule,R-0Print      isosorbide mononitrate (IMDUR) 30 MG extended release tablet Take 30 mg by mouth dailyHistorical Med      simvastatin (ZOCOR) 10 MG tablet Take 40 mg by mouth nightly Historical Med             ALLERGIES     has No Known Allergies. FAMILY HISTORY     has no family status information on file. family history is not on file. SOCIAL HISTORY      reports that he has quit smoking. His smoking use included cigarettes. He smoked an average of .5 packs per day. He has never used smokeless tobacco. He reports current alcohol use. He reports that he does not use drugs. PHYSICAL EXAM     INITIAL VITALS:  height is 6' (1.829 m) and weight is 270 lb (122.5 kg). His oral temperature is 97.9 °F (36.6 °C). His blood pressure is 123/67 and his pulse is 67. His respiration is 20 and oxygen saturation is 94%. Physical Exam   Constitutional: Patient is oriented to person, place, and time. Patient appears well-developed and well-nourished. Patient is active and cooperative. Neck: Full passive range of motion without pain and phonation normal.   Cardiovascular:  Normal rate, regular rhythm and intact distal pulses. Pulses: Right radial pulse  2+   Pulmonary/Chest: Effort normal. No tachypnea and no bradypnea.    Abdominal: BMI 36.6, patient without distension   Musculoskeletal:   Focused though limited examination of patient's right knee, patient sitting in wheeled chair, there is no gross trauma or deformity, there is some subtle prepatellar swelling noted, patella appears midline and in tract, slight effusion, limited exam for joint laxity is grossly negative, distal CSM intact    Except as otherwise noted, negative acute trauma or deformity,  apparent full range of motion and normal strength all extremities appropriate to age. Neurological: Patient is alert and oriented to person, place, and time. patient displays no tremor. Patient displays no seizure activity. .     Skin: Skin is warm and dry. Patient is not diaphoretic. Psychiatric: Patient has a normal mood and affect. Patient speech is normal and behavior is normal. Cognition and memory are normal.     DIFFERENTIAL DIAGNOSIS:   Fracture dislocation sprain strain bursitis contusion    DIAGNOSTIC RESULTS         RADIOLOGY: non-plain film images(s) such as CT, Ultrasound and MRI are read by the radiologist.  XR KNEE RIGHT (MIN 4 VIEWS)   Final Result      Soft tissue swelling without acute osseous finding                 LABS:   Labs Reviewed - No data to display    EMERGENCY DEPARTMENT COURSE:   Vitals:    Vitals:    09/14/22 1249   BP: 123/67   Pulse: 67   Resp: 20   Temp: 97.9 °F (36.6 °C)   TempSrc: Oral   SpO2: 94%   Weight: 270 lb (122.5 kg)   Height: 6' (1.829 m)     Patient history and physical exam taken at bedside, discussed patient symptoms and exam findings, discussed getting x-rays of the right knee and will reevaluate, discussed patient remaining symptoms consistent with sciatica for which patient had in the past, acknowledges    P.o.  Norco and Zofran ODT given for pain and nausea control prior to imaging    OARRS = 400, last narcotic prescription for Norco #15 on 2/2022    Imaging reviewed    Discussed patient imaging findings, discussed likely prepatellar bursitis from striking his knee as he previously mentioned, discussed sciatica on the right side, at this time I feel we can safely discharge patient home, will start patient on steroid burst, Norco for any breakthrough pain, follow-up with primary care, acknowledged        FINAL IMPRESSION      1. Sciatica of right side    2. Prepatellar bursitis, right knee          DISPOSITION/PLAN   D/c    PATIENT REFERRED TO:  [address] [facility]  [phone]    Call         DISCHARGE MEDICATIONS:  Discharge Medication List as of 9/14/2022  2:34 PM        START taking these medications    Details   HYDROcodone-acetaminophen (NORCO) 5-325 MG per tablet Take 1 tablet by mouth every 6 hours as needed for Pain for up to 3 days. Intended supply: 3 days. Take lowest dose possible to manage pain, Disp-12 tablet, R-0Normal      predniSONE (DELTASONE) 20 MG tablet Take 3 tablets by mouth daily for 5 days, Disp-15 tablet, R-0Normal                 Summation      Patient Course:  d/c    ED Medications administered this visit:    Medications   HYDROcodone-acetaminophen (NORCO) 5-325 MG per tablet 1 tablet (1 tablet Oral Given 9/14/22 1313)   ondansetron (ZOFRAN-ODT) disintegrating tablet 4 mg (4 mg Oral Given 9/14/22 1313)       New Prescriptions from this visit:    Discharge Medication List as of 9/14/2022  2:34 PM        START taking these medications    Details   HYDROcodone-acetaminophen (NORCO) 5-325 MG per tablet Take 1 tablet by mouth every 6 hours as needed for Pain for up to 3 days. Intended supply: 3 days. Take lowest dose possible to manage pain, Disp-12 tablet, R-0Normal      predniSONE (DELTASONE) 20 MG tablet Take 3 tablets by mouth daily for 5 days, Disp-15 tablet, R-0Normal             Follow-up:  [address]  [phone]    Call           Final Impression:   1. Sciatica of right side    2.  Prepatellar bursitis, right knee               (Please note that portions of this note were completed with a voice recognition program.  Efforts were made to edit the dictations but occasionally words are mis-transcribed.)    Brigitte Dings, MD Johnnie Schwab Chivo Tobar MD  09/15/22 2084

## 2023-06-23 ENCOUNTER — HOSPITAL ENCOUNTER
Dept: HOSPITAL 101 - WC | Age: 68
Discharge: HOME | End: 2023-06-23
Payer: MEDICARE

## 2023-06-23 DIAGNOSIS — I73.9: ICD-10-CM

## 2023-06-23 DIAGNOSIS — I83.819: ICD-10-CM

## 2023-06-23 DIAGNOSIS — L97.412: ICD-10-CM

## 2023-06-23 DIAGNOSIS — M79.671: Primary | ICD-10-CM

## 2023-06-23 DIAGNOSIS — I10: ICD-10-CM

## 2023-06-23 DIAGNOSIS — I50.9: ICD-10-CM

## 2023-06-23 DIAGNOSIS — L97.519: ICD-10-CM

## 2023-06-23 DIAGNOSIS — R60.9: ICD-10-CM

## 2023-06-23 DIAGNOSIS — L89.893: ICD-10-CM

## 2023-06-23 DIAGNOSIS — E78.5: ICD-10-CM

## 2023-06-23 DIAGNOSIS — I72.3: ICD-10-CM

## 2023-06-23 DIAGNOSIS — I87.2: ICD-10-CM

## 2023-06-23 DIAGNOSIS — M10.9: ICD-10-CM

## 2023-06-23 DIAGNOSIS — I70.234: ICD-10-CM

## 2023-06-23 DIAGNOSIS — L84: ICD-10-CM

## 2023-06-23 PROCEDURE — 11042 DBRDMT SUBQ TIS 1ST 20SQCM/<: CPT

## 2023-06-23 PROCEDURE — 73630 X-RAY EXAM OF FOOT: CPT

## 2023-06-23 PROCEDURE — G0463 HOSPITAL OUTPT CLINIC VISIT: HCPCS

## 2023-06-26 ENCOUNTER — HOSPITAL ENCOUNTER
Dept: HOSPITAL 101 - CT | Age: 68
Discharge: HOME | End: 2023-06-26
Payer: MEDICARE

## 2023-06-26 DIAGNOSIS — R91.8: Primary | ICD-10-CM

## 2023-06-26 DIAGNOSIS — Z12.5: ICD-10-CM

## 2023-06-26 DIAGNOSIS — I10: ICD-10-CM

## 2023-06-26 DIAGNOSIS — Z87.891: ICD-10-CM

## 2023-06-26 LAB
ESTIMATED GFR (AFRICAN AMERICA: >60 (ref 60–?)
ESTIMATED GFR (NON-AFRICAN AME: >60 (ref 60–?)
FREE T3: 2.39 PG/ML (ref 2.18–3.98)
THYROID STIMULATING HORMONE: 3.33 UIU/ML (ref 0.36–3.74)

## 2023-06-26 PROCEDURE — 82565 ASSAY OF CREATININE: CPT

## 2023-06-26 PROCEDURE — 84443 ASSAY THYROID STIM HORMONE: CPT

## 2023-06-26 PROCEDURE — 83036 HEMOGLOBIN GLYCOSYLATED A1C: CPT

## 2023-06-26 PROCEDURE — 84436 ASSAY OF TOTAL THYROXINE: CPT

## 2023-06-26 PROCEDURE — 36415 COLL VENOUS BLD VENIPUNCTURE: CPT

## 2023-06-26 PROCEDURE — 84481 FREE ASSAY (FT-3): CPT

## 2023-06-26 PROCEDURE — 71271 CT THORAX LUNG CANCER SCR C-: CPT

## 2023-06-26 PROCEDURE — G0103 PSA SCREENING: HCPCS

## 2023-07-07 ENCOUNTER — HOSPITAL ENCOUNTER
Dept: HOSPITAL 101 - WC | Age: 68
Discharge: HOME | End: 2023-07-07
Payer: MEDICARE

## 2023-07-07 DIAGNOSIS — L89.893: Primary | ICD-10-CM

## 2023-07-07 PROCEDURE — G0463 HOSPITAL OUTPT CLINIC VISIT: HCPCS

## 2023-07-07 PROCEDURE — A6021 COLLAGEN DRESSING <=16 SQ IN: HCPCS

## 2023-07-21 ENCOUNTER — HOSPITAL ENCOUNTER
Dept: HOSPITAL 101 - PETCT | Age: 68
Discharge: HOME | End: 2023-07-21
Payer: MEDICARE

## 2023-07-21 DIAGNOSIS — C34.90: Primary | ICD-10-CM

## 2023-07-21 PROCEDURE — A9552 F18 FDG: HCPCS

## 2023-07-21 PROCEDURE — 78815 PET IMAGE W/CT SKULL-THIGH: CPT

## 2023-08-08 ENCOUNTER — HOSPITAL ENCOUNTER
Dept: HOSPITAL 101 - WC | Age: 68
Discharge: HOME | End: 2023-08-08
Payer: MEDICARE

## 2023-08-08 ENCOUNTER — HOSPITAL ENCOUNTER
Dept: HOSPITAL 101 - LAB | Age: 68
Discharge: HOME | End: 2023-08-08
Payer: MEDICARE

## 2023-08-08 DIAGNOSIS — L89.893: Primary | ICD-10-CM

## 2023-08-08 DIAGNOSIS — M20.22: ICD-10-CM

## 2023-08-08 DIAGNOSIS — I77.9: Primary | ICD-10-CM

## 2023-08-08 DIAGNOSIS — M10.9: ICD-10-CM

## 2023-08-08 LAB
ESTIMATED GFR (AFRICAN AMERICA: >60 (ref 60–?)
ESTIMATED GFR (NON-AFRICAN AME: 59 (ref 60–?)

## 2023-08-08 PROCEDURE — 36415 COLL VENOUS BLD VENIPUNCTURE: CPT

## 2023-08-08 PROCEDURE — G0463 HOSPITAL OUTPT CLINIC VISIT: HCPCS

## 2023-08-08 PROCEDURE — 82565 ASSAY OF CREATININE: CPT

## 2023-08-18 ENCOUNTER — HOSPITAL ENCOUNTER
Dept: HOSPITAL 101 - US | Age: 68
Discharge: HOME | End: 2023-08-18
Payer: MEDICARE

## 2023-08-18 DIAGNOSIS — R22.2: Primary | ICD-10-CM

## 2023-08-18 PROCEDURE — 76604 US EXAM CHEST: CPT

## 2023-08-25 ENCOUNTER — HOSPITAL ENCOUNTER
Dept: HOSPITAL 101 - WC | Age: 68
Discharge: HOME | End: 2023-08-25
Payer: MEDICARE

## 2023-08-25 DIAGNOSIS — M10.9: ICD-10-CM

## 2023-08-25 DIAGNOSIS — L89.893: Primary | ICD-10-CM

## 2023-08-25 PROCEDURE — 11042 DBRDMT SUBQ TIS 1ST 20SQCM/<: CPT

## 2023-09-01 ENCOUNTER — HOSPITAL ENCOUNTER
Dept: HOSPITAL 101 - LAB | Age: 68
Discharge: HOME | End: 2023-09-01
Payer: MEDICARE

## 2023-09-01 DIAGNOSIS — Z01.818: Primary | ICD-10-CM

## 2023-09-01 DIAGNOSIS — I70.221: ICD-10-CM

## 2023-09-01 LAB
ADD MANUAL DIFF: NO
HCT VFR BLD CALC: 37.2 % (ref 42–54)
HEMATOCRIT: 37.2 % (ref 42–54)
HEMOGLOBIN: 12.3 G/DL (ref 14–18)
IMMATURE GRANULOCYTES ABS AUTO: 0.03 10^3/UL (ref 0–0.03)
IMMATURE GRANULOCYTES PCT AUTO: 0.5 % (ref 0–0.5)
INR: 1.02
LYMPHOCYTES  ABSOLUTE AUTO: 1.1 10^3/UL (ref 1.2–3.8)
MCV RBC: 95.6 FL (ref 80–94)
MEAN CORPUSCULAR HEMOGLOBIN: 31.6 PG (ref 25.9–34)
MEAN CORPUSCULAR HGB CONC: 33.1 G/DL (ref 29.9–35.2)
MEAN CORPUSCULAR VOLUME: 95.6 FL (ref 80–94)
PARTIAL THROMBOPLASTIN TIME: 31.4 SEC (ref 22.3–36.2)
PLATELET # BLD: 176 10^3/UL (ref 150–450)
PLATELET COUNT: 176 10^3/UL (ref 150–450)
PROTHROMBIN TIME: 10.8 SEC (ref 9–11.6)
RED BLOOD COUNT: 3.89 10^6/UL (ref 4.7–6.1)
WBC # BLD: 6.5 10^3/UL (ref 4–11)
WHITE BLOOD COUNT: 6.5 10^3/UL (ref 4–11)

## 2023-09-01 PROCEDURE — 87186 SC STD MICRODIL/AGAR DIL: CPT

## 2023-09-01 PROCEDURE — 87081 CULTURE SCREEN ONLY: CPT

## 2023-09-01 PROCEDURE — 87150 DNA/RNA AMPLIFIED PROBE: CPT

## 2023-09-01 PROCEDURE — 85730 THROMBOPLASTIN TIME PARTIAL: CPT

## 2023-09-01 PROCEDURE — 85025 COMPLETE CBC W/AUTO DIFF WBC: CPT

## 2023-09-01 PROCEDURE — 85610 PROTHROMBIN TIME: CPT

## 2023-09-01 PROCEDURE — 36415 COLL VENOUS BLD VENIPUNCTURE: CPT

## 2023-09-02 ENCOUNTER — HOSPITAL ENCOUNTER
Dept: HOSPITAL 101 - LAB | Age: 68
Discharge: HOME | End: 2023-09-02
Payer: MEDICARE

## 2023-09-02 DIAGNOSIS — I70.221: ICD-10-CM

## 2023-09-02 DIAGNOSIS — Z01.818: Primary | ICD-10-CM

## 2023-09-02 LAB
ANION GAP: 10.7
BLOOD UREA NITROGEN: 17 MG/DL (ref 7–18)
CALCIUM: 9.2 MG/DL (ref 8.5–10.1)
CARBON DIOXIDE: 27.3 MMOL/L (ref 21–32)
CHLORIDE: 102 MMOL/L (ref 98–107)
CO2 BLD-SCNC: 27.3 MMOL/L (ref 21–32)
ESTIMATED GFR (AFRICAN AMERICA: >60 (ref 60–?)
ESTIMATED GFR (NON-AFRICAN AME: 59 (ref 60–?)
GLUCOSE BLD-MCNC: 85 MG/DL (ref 74–106)
POTASSIUM SERPLBLD-SCNC: 4 MMOL/L (ref 3.5–5.1)
POTASSIUM: 4 MMOL/L (ref 3.5–5.1)
SODIUM BLD-SCNC: 136 MMOL/L (ref 136–145)
SODIUM: 136 MMOL/L (ref 136–145)

## 2023-09-02 PROCEDURE — 80048 BASIC METABOLIC PNL TOTAL CA: CPT

## 2023-09-02 PROCEDURE — 36415 COLL VENOUS BLD VENIPUNCTURE: CPT

## 2023-09-08 ENCOUNTER — HOSPITAL ENCOUNTER
Dept: HOSPITAL 101 - WC | Age: 68
Discharge: HOME | End: 2023-09-08
Payer: MEDICARE

## 2023-09-08 DIAGNOSIS — L89.893: Primary | ICD-10-CM

## 2023-09-08 DIAGNOSIS — L97.522: ICD-10-CM

## 2023-09-08 PROCEDURE — 11042 DBRDMT SUBQ TIS 1ST 20SQCM/<: CPT

## 2023-09-26 ENCOUNTER — HOSPITAL ENCOUNTER
Dept: HOSPITAL 101 - WC | Age: 68
Discharge: HOME | End: 2023-09-26
Payer: MEDICARE

## 2023-09-26 DIAGNOSIS — L97.522: ICD-10-CM

## 2023-09-26 DIAGNOSIS — L89.893: Primary | ICD-10-CM

## 2023-09-26 PROCEDURE — 11042 DBRDMT SUBQ TIS 1ST 20SQCM/<: CPT

## 2023-10-17 ENCOUNTER — HOSPITAL ENCOUNTER
Dept: HOSPITAL 101 - WC | Age: 68
Discharge: HOME | End: 2023-10-17
Payer: MEDICARE

## 2023-10-17 DIAGNOSIS — L89.893: Primary | ICD-10-CM

## 2023-10-17 DIAGNOSIS — L97.522: ICD-10-CM

## 2023-10-17 PROCEDURE — 11042 DBRDMT SUBQ TIS 1ST 20SQCM/<: CPT

## 2023-11-08 ENCOUNTER — HOSPITAL ENCOUNTER
Dept: HOSPITAL 101 - CT | Age: 68
Discharge: HOME | End: 2023-11-08
Payer: MEDICARE

## 2023-11-08 DIAGNOSIS — R91.8: Primary | ICD-10-CM

## 2023-11-08 DIAGNOSIS — L72.3: ICD-10-CM

## 2023-11-08 PROCEDURE — 71250 CT THORAX DX C-: CPT

## 2023-12-08 ENCOUNTER — HOSPITAL ENCOUNTER
Dept: HOSPITAL 101 - WC | Age: 68
Discharge: HOME | End: 2023-12-08
Payer: MEDICARE

## 2023-12-08 DIAGNOSIS — M79.672: ICD-10-CM

## 2023-12-08 DIAGNOSIS — L89.893: ICD-10-CM

## 2023-12-08 DIAGNOSIS — M79.671: Primary | ICD-10-CM

## 2023-12-08 DIAGNOSIS — L97.522: ICD-10-CM

## 2023-12-08 PROCEDURE — 73630 X-RAY EXAM OF FOOT: CPT

## 2023-12-08 PROCEDURE — 11042 DBRDMT SUBQ TIS 1ST 20SQCM/<: CPT

## 2023-12-27 ENCOUNTER — HOSPITAL ENCOUNTER
Dept: HOSPITAL 101 - CT | Age: 68
Discharge: HOME | End: 2023-12-27
Payer: MEDICARE

## 2023-12-27 DIAGNOSIS — M20.21: Primary | ICD-10-CM

## 2023-12-27 DIAGNOSIS — M19.071: ICD-10-CM

## 2023-12-27 PROCEDURE — 73700 CT LOWER EXTREMITY W/O DYE: CPT

## 2023-12-29 ENCOUNTER — HOSPITAL ENCOUNTER
Dept: HOSPITAL 101 - WC | Age: 68
Discharge: HOME | End: 2023-12-29
Payer: MEDICARE

## 2023-12-29 DIAGNOSIS — L89.893: Primary | ICD-10-CM

## 2023-12-29 DIAGNOSIS — M10.9: ICD-10-CM

## 2023-12-29 DIAGNOSIS — L97.522: ICD-10-CM

## 2023-12-29 DIAGNOSIS — S90.822A: ICD-10-CM

## 2023-12-29 DIAGNOSIS — M20.41: ICD-10-CM

## 2023-12-29 DIAGNOSIS — M25.871: ICD-10-CM

## 2023-12-29 DIAGNOSIS — M20.22: ICD-10-CM

## 2023-12-29 PROCEDURE — 97597 DBRDMT OPN WND 1ST 20 CM/<: CPT

## 2024-02-01 ENCOUNTER — HOSPITAL ENCOUNTER
Age: 69
Discharge: HOME | End: 2024-02-01
Payer: MEDICARE

## 2024-02-01 VITALS
HEART RATE: 52 BPM | DIASTOLIC BLOOD PRESSURE: 54 MMHG | RESPIRATION RATE: 20 BRPM | OXYGEN SATURATION: 97 % | SYSTOLIC BLOOD PRESSURE: 149 MMHG | TEMPERATURE: 97.16 F

## 2024-02-01 DIAGNOSIS — Z01.810: Primary | ICD-10-CM

## 2024-02-01 DIAGNOSIS — M20.21: ICD-10-CM

## 2024-02-01 DIAGNOSIS — Z01.812: ICD-10-CM

## 2024-02-01 DIAGNOSIS — Z01.818: ICD-10-CM

## 2024-02-01 DIAGNOSIS — M25.871: ICD-10-CM

## 2024-02-01 LAB
ADD MANUAL DIFF: NO
ANION GAP: 11.4
BLOOD UREA NITROGEN: 16 MG/DL (ref 7–18)
CALCIUM: 8.9 MG/DL (ref 8.5–10.1)
CARBON DIOXIDE: 30.5 MMOL/L (ref 21–32)
CHLORIDE: 101 MMOL/L (ref 98–107)
CO2 BLD-SCNC: 30.5 MMOL/L (ref 21–32)
ESTIMATED GFR (AFRICAN AMERICA: >60 (ref 60–?)
ESTIMATED GFR (NON-AFRICAN AME: >60 (ref 60–?)
GLUCOSE BLD-MCNC: 78 MG/DL (ref 74–106)
HCT VFR BLD CALC: 36.7 % (ref 42–54)
HEMATOCRIT: 36.7 % (ref 42–54)
HEMOGLOBIN: 12.3 G/DL (ref 14–18)
IMMATURE GRANULOCYTES ABS AUTO: 0.01 10^3/UL (ref 0–0.03)
IMMATURE GRANULOCYTES PCT AUTO: 0.1 % (ref 0–0.5)
INR: 1.06
LYMPHOCYTES  ABSOLUTE AUTO: 1.4 10^3/UL (ref 1.2–3.8)
MCV RBC: 96.8 FL (ref 80–94)
MEAN CORPUSCULAR HEMOGLOBIN: 32.5 PG (ref 25.9–34)
MEAN CORPUSCULAR HGB CONC: 33.5 G/DL (ref 29.9–35.2)
MEAN CORPUSCULAR VOLUME: 96.8 FL (ref 80–94)
PARTIAL THROMBOPLASTIN TIME: 32.2 SEC (ref 22.3–36.2)
PLATELET # BLD: 143 10^3/UL (ref 150–450)
PLATELET COUNT: 143 10^3/UL (ref 150–450)
POTASSIUM SERPLBLD-SCNC: 3.9 MMOL/L (ref 3.5–5.1)
POTASSIUM: 3.9 MMOL/L (ref 3.5–5.1)
PROTHROMBIN TIME: 11.2 SEC (ref 9–11.6)
RED BLOOD COUNT: 3.79 10^6/UL (ref 4.7–6.1)
SODIUM BLD-SCNC: 139 MMOL/L (ref 136–145)
SODIUM: 139 MMOL/L (ref 136–145)
WBC # BLD: 7 10^3/UL (ref 4–11)
WHITE BLOOD COUNT: 7 10^3/UL (ref 4–11)

## 2024-02-01 PROCEDURE — 85610 PROTHROMBIN TIME: CPT

## 2024-02-01 PROCEDURE — 36415 COLL VENOUS BLD VENIPUNCTURE: CPT

## 2024-02-01 PROCEDURE — 85730 THROMBOPLASTIN TIME PARTIAL: CPT

## 2024-02-01 PROCEDURE — 71046 X-RAY EXAM CHEST 2 VIEWS: CPT

## 2024-02-01 PROCEDURE — 80048 BASIC METABOLIC PNL TOTAL CA: CPT

## 2024-02-01 PROCEDURE — 93005 ELECTROCARDIOGRAM TRACING: CPT

## 2024-02-01 PROCEDURE — G0463 HOSPITAL OUTPT CLINIC VISIT: HCPCS

## 2024-02-15 ENCOUNTER — HOSPITAL ENCOUNTER (OUTPATIENT)
Age: 69
Discharge: HOME | End: 2024-02-15
Payer: MEDICARE

## 2024-02-15 VITALS
DIASTOLIC BLOOD PRESSURE: 52 MMHG | SYSTOLIC BLOOD PRESSURE: 111 MMHG | RESPIRATION RATE: 16 BRPM | HEART RATE: 48 BPM | OXYGEN SATURATION: 95 %

## 2024-02-15 VITALS
RESPIRATION RATE: 6 BRPM | DIASTOLIC BLOOD PRESSURE: 58 MMHG | SYSTOLIC BLOOD PRESSURE: 116 MMHG | OXYGEN SATURATION: 94 % | HEART RATE: 58 BPM

## 2024-02-15 VITALS
SYSTOLIC BLOOD PRESSURE: 100 MMHG | HEART RATE: 54 BPM | DIASTOLIC BLOOD PRESSURE: 46 MMHG | RESPIRATION RATE: 14 BRPM | OXYGEN SATURATION: 96 %

## 2024-02-15 VITALS
SYSTOLIC BLOOD PRESSURE: 116 MMHG | TEMPERATURE: 97.16 F | RESPIRATION RATE: 14 BRPM | OXYGEN SATURATION: 94 % | HEART RATE: 54 BPM | DIASTOLIC BLOOD PRESSURE: 43 MMHG

## 2024-02-15 VITALS
DIASTOLIC BLOOD PRESSURE: 44 MMHG | SYSTOLIC BLOOD PRESSURE: 98 MMHG | RESPIRATION RATE: 22 BRPM | HEART RATE: 49 BPM | OXYGEN SATURATION: 93 %

## 2024-02-15 VITALS
DIASTOLIC BLOOD PRESSURE: 52 MMHG | SYSTOLIC BLOOD PRESSURE: 112 MMHG | RESPIRATION RATE: 20 BRPM | HEART RATE: 56 BPM | OXYGEN SATURATION: 95 %

## 2024-02-15 VITALS
SYSTOLIC BLOOD PRESSURE: 127 MMHG | HEART RATE: 47 BPM | DIASTOLIC BLOOD PRESSURE: 51 MMHG | OXYGEN SATURATION: 94 % | RESPIRATION RATE: 20 BRPM

## 2024-02-15 VITALS — BODY MASS INDEX: 36.8 KG/M2

## 2024-02-15 VITALS
HEART RATE: 58 BPM | RESPIRATION RATE: 20 BRPM | OXYGEN SATURATION: 95 % | DIASTOLIC BLOOD PRESSURE: 52 MMHG | SYSTOLIC BLOOD PRESSURE: 110 MMHG

## 2024-02-15 VITALS
DIASTOLIC BLOOD PRESSURE: 46 MMHG | TEMPERATURE: 96.7 F | OXYGEN SATURATION: 96 % | SYSTOLIC BLOOD PRESSURE: 113 MMHG | HEART RATE: 53 BPM | RESPIRATION RATE: 20 BRPM

## 2024-02-15 DIAGNOSIS — L97.509: ICD-10-CM

## 2024-02-15 DIAGNOSIS — E78.5: ICD-10-CM

## 2024-02-15 DIAGNOSIS — Z95.810: ICD-10-CM

## 2024-02-15 DIAGNOSIS — Z87.01: ICD-10-CM

## 2024-02-15 DIAGNOSIS — E11.51: ICD-10-CM

## 2024-02-15 DIAGNOSIS — M10.9: ICD-10-CM

## 2024-02-15 DIAGNOSIS — I25.10: ICD-10-CM

## 2024-02-15 DIAGNOSIS — M25.871: ICD-10-CM

## 2024-02-15 DIAGNOSIS — I48.0: ICD-10-CM

## 2024-02-15 DIAGNOSIS — E11.621: ICD-10-CM

## 2024-02-15 DIAGNOSIS — I13.0: ICD-10-CM

## 2024-02-15 DIAGNOSIS — I50.22: ICD-10-CM

## 2024-02-15 DIAGNOSIS — Z87.891: ICD-10-CM

## 2024-02-15 DIAGNOSIS — J44.9: ICD-10-CM

## 2024-02-15 DIAGNOSIS — M20.21: Primary | ICD-10-CM

## 2024-02-15 DIAGNOSIS — N18.9: ICD-10-CM

## 2024-02-15 LAB
ADD MANUAL DIFF: NO
HCT VFR BLD CALC: 37.8 % (ref 42–54)
HEMATOCRIT: 37.8 % (ref 42–54)
HEMOGLOBIN: 12.3 G/DL (ref 14–18)
IMMATURE GRANULOCYTES ABS AUTO: 0.02 10^3/UL (ref 0–0.03)
IMMATURE GRANULOCYTES PCT AUTO: 0.3 % (ref 0–0.5)
LYMPHOCYTES  ABSOLUTE AUTO: 2 10^3/UL (ref 1.2–3.8)
MCV RBC: 99.7 FL (ref 80–94)
MEAN CORPUSCULAR HEMOGLOBIN: 32.5 PG (ref 25.9–34)
MEAN CORPUSCULAR HGB CONC: 32.5 G/DL (ref 29.9–35.2)
MEAN CORPUSCULAR VOLUME: 99.7 FL (ref 80–94)
PLATELET # BLD: 164 10^3/UL (ref 150–450)
PLATELET COUNT: 164 10^3/UL (ref 150–450)
RED BLOOD COUNT: 3.79 10^6/UL (ref 4.7–6.1)
WBC # BLD: 7.7 10^3/UL (ref 4–11)
WHITE BLOOD COUNT: 7.7 10^3/UL (ref 4–11)

## 2024-02-15 PROCEDURE — 85025 COMPLETE CBC W/AUTO DIFF WBC: CPT

## 2024-02-15 PROCEDURE — 28285 REPAIR OF HAMMERTOE: CPT

## 2024-02-15 PROCEDURE — 76942 ECHO GUIDE FOR BIOPSY: CPT

## 2024-02-15 PROCEDURE — 88311 DECALCIFY TISSUE: CPT

## 2024-02-15 PROCEDURE — 64450 NJX AA&/STRD OTHER PN/BRANCH: CPT

## 2024-02-15 PROCEDURE — 88305 TISSUE EXAM BY PATHOLOGIST: CPT

## 2024-02-15 PROCEDURE — 28755 FUSION OF BIG TOE JOINT: CPT

## 2024-02-15 PROCEDURE — 64445 NJX AA&/STRD SCIATIC NRV IMG: CPT

## 2024-02-15 PROCEDURE — 76000 FLUOROSCOPY <1 HR PHYS/QHP: CPT

## 2024-02-15 PROCEDURE — 36415 COLL VENOUS BLD VENIPUNCTURE: CPT

## 2024-02-15 PROCEDURE — 28750 FUSION OF BIG TOE JOINT: CPT

## 2024-02-15 PROCEDURE — 82948 REAGENT STRIP/BLOOD GLUCOSE: CPT

## 2024-02-15 PROCEDURE — 28005 TREAT FOOT BONE LESION: CPT

## 2024-02-15 PROCEDURE — 73630 X-RAY EXAM OF FOOT: CPT

## 2024-02-15 PROCEDURE — C1713 ANCHOR/SCREW BN/BN,TIS/BN: HCPCS

## 2024-02-20 ENCOUNTER — HOSPITAL ENCOUNTER
Dept: HOSPITAL 101 - WC | Age: 69
Discharge: HOME | End: 2024-02-20
Payer: MEDICARE

## 2024-02-20 DIAGNOSIS — L97.522: ICD-10-CM

## 2024-02-20 DIAGNOSIS — E11.621: ICD-10-CM

## 2024-02-20 DIAGNOSIS — L89.893: Primary | ICD-10-CM

## 2024-02-20 DIAGNOSIS — T81.89XA: ICD-10-CM

## 2024-02-20 PROCEDURE — G0463 HOSPITAL OUTPT CLINIC VISIT: HCPCS

## 2024-02-27 ENCOUNTER — HOSPITAL ENCOUNTER
Dept: HOSPITAL 101 - WC | Age: 69
Discharge: HOME | End: 2024-02-27
Payer: MEDICARE

## 2024-02-27 DIAGNOSIS — T81.89XA: ICD-10-CM

## 2024-02-27 DIAGNOSIS — L89.893: Primary | ICD-10-CM

## 2024-02-27 DIAGNOSIS — L97.522: ICD-10-CM

## 2024-02-27 DIAGNOSIS — E11.621: ICD-10-CM

## 2024-02-27 PROCEDURE — 29445 APPL RIGID TOT CNTC LEG CAST: CPT

## 2024-03-05 ENCOUNTER — HOSPITAL ENCOUNTER
Dept: HOSPITAL 101 - WC | Age: 69
Discharge: HOME | End: 2024-03-05
Payer: MEDICARE

## 2024-03-05 DIAGNOSIS — M79.671: Primary | ICD-10-CM

## 2024-03-05 DIAGNOSIS — L89.616: ICD-10-CM

## 2024-03-05 DIAGNOSIS — E11.621: ICD-10-CM

## 2024-03-05 DIAGNOSIS — L89.893: ICD-10-CM

## 2024-03-05 DIAGNOSIS — L97.522: ICD-10-CM

## 2024-03-05 DIAGNOSIS — T81.89XA: ICD-10-CM

## 2024-03-05 PROCEDURE — 73630 X-RAY EXAM OF FOOT: CPT

## 2024-03-05 PROCEDURE — A6213 FOAM DRG >16<=48 SQ IN W/BDR: HCPCS

## 2024-03-05 PROCEDURE — 11043 DBRDMT MUSC&/FSCA 1ST 20/<: CPT

## 2024-03-06 ENCOUNTER — HOSPITAL ENCOUNTER
Dept: HOSPITAL 101 - PST | Age: 69
Discharge: HOME | End: 2024-03-06
Payer: MEDICARE

## 2024-03-06 DIAGNOSIS — T81.31XA: ICD-10-CM

## 2024-03-06 DIAGNOSIS — Z01.818: Primary | ICD-10-CM

## 2024-03-07 ENCOUNTER — HOSPITAL ENCOUNTER (OUTPATIENT)
Age: 69
Discharge: HOME | End: 2024-03-07
Payer: MEDICARE

## 2024-03-07 VITALS
RESPIRATION RATE: 18 BRPM | HEART RATE: 50 BPM | OXYGEN SATURATION: 96 % | SYSTOLIC BLOOD PRESSURE: 145 MMHG | DIASTOLIC BLOOD PRESSURE: 54 MMHG | TEMPERATURE: 96.62 F

## 2024-03-07 VITALS
DIASTOLIC BLOOD PRESSURE: 43 MMHG | OXYGEN SATURATION: 94 % | SYSTOLIC BLOOD PRESSURE: 117 MMHG | RESPIRATION RATE: 18 BRPM | HEART RATE: 53 BPM | TEMPERATURE: 97.7 F

## 2024-03-07 VITALS
DIASTOLIC BLOOD PRESSURE: 72 MMHG | RESPIRATION RATE: 16 BRPM | SYSTOLIC BLOOD PRESSURE: 155 MMHG | OXYGEN SATURATION: 95 % | HEART RATE: 47 BPM

## 2024-03-07 VITALS — BODY MASS INDEX: 36.7 KG/M2

## 2024-03-07 DIAGNOSIS — L89.616: ICD-10-CM

## 2024-03-07 DIAGNOSIS — E78.5: ICD-10-CM

## 2024-03-07 DIAGNOSIS — Z79.899: ICD-10-CM

## 2024-03-07 DIAGNOSIS — I50.9: ICD-10-CM

## 2024-03-07 DIAGNOSIS — T81.31XA: Primary | ICD-10-CM

## 2024-03-07 DIAGNOSIS — I11.0: ICD-10-CM

## 2024-03-07 DIAGNOSIS — Z79.01: ICD-10-CM

## 2024-03-07 DIAGNOSIS — J44.9: ICD-10-CM

## 2024-03-07 DIAGNOSIS — I87.2: ICD-10-CM

## 2024-03-07 DIAGNOSIS — M10.9: ICD-10-CM

## 2024-03-07 DIAGNOSIS — Z87.891: ICD-10-CM

## 2024-03-07 DIAGNOSIS — L84: ICD-10-CM

## 2024-03-07 DIAGNOSIS — R60.0: ICD-10-CM

## 2024-03-07 DIAGNOSIS — L97.519: ICD-10-CM

## 2024-03-07 DIAGNOSIS — E11.621: ICD-10-CM

## 2024-03-07 DIAGNOSIS — I83.819: ICD-10-CM

## 2024-03-07 DIAGNOSIS — I73.9: ICD-10-CM

## 2024-03-07 DIAGNOSIS — L89.893: ICD-10-CM

## 2024-03-07 DIAGNOSIS — L97.522: ICD-10-CM

## 2024-03-07 PROCEDURE — 36415 COLL VENOUS BLD VENIPUNCTURE: CPT

## 2024-03-07 PROCEDURE — 13160 SEC CLSR SURG WND/DEHSN XTN: CPT

## 2024-03-07 PROCEDURE — 82948 REAGENT STRIP/BLOOD GLUCOSE: CPT

## 2024-03-15 ENCOUNTER — HOSPITAL ENCOUNTER
Dept: HOSPITAL 101 - WC | Age: 69
Discharge: HOME | End: 2024-03-15
Payer: MEDICARE

## 2024-03-15 DIAGNOSIS — L89.893: Primary | ICD-10-CM

## 2024-03-15 DIAGNOSIS — M10.9: ICD-10-CM

## 2024-03-15 PROCEDURE — 29445 APPL RIGID TOT CNTC LEG CAST: CPT

## 2024-03-22 ENCOUNTER — HOSPITAL ENCOUNTER
Dept: HOSPITAL 101 - WC | Age: 69
Discharge: HOME | End: 2024-03-22
Payer: MEDICARE

## 2024-03-22 DIAGNOSIS — L97.522: ICD-10-CM

## 2024-03-22 DIAGNOSIS — L89.893: Primary | ICD-10-CM

## 2024-03-22 DIAGNOSIS — S90.424A: ICD-10-CM

## 2024-03-22 DIAGNOSIS — L89.616: ICD-10-CM

## 2024-03-22 PROCEDURE — 29445 APPL RIGID TOT CNTC LEG CAST: CPT

## 2024-03-29 ENCOUNTER — HOSPITAL ENCOUNTER
Dept: HOSPITAL 101 - WC | Age: 69
Discharge: HOME | End: 2024-03-29
Payer: MEDICARE

## 2024-03-29 DIAGNOSIS — L89.893: Primary | ICD-10-CM

## 2024-04-02 ENCOUNTER — HOSPITAL ENCOUNTER
Dept: HOSPITAL 101 - WC | Age: 69
Discharge: HOME | End: 2024-04-02
Payer: MEDICARE

## 2024-04-02 DIAGNOSIS — L97.522: ICD-10-CM

## 2024-04-02 DIAGNOSIS — L89.893: Primary | ICD-10-CM

## 2024-04-02 DIAGNOSIS — S90.424A: ICD-10-CM

## 2024-04-02 PROCEDURE — 29445 APPL RIGID TOT CNTC LEG CAST: CPT

## 2024-04-09 ENCOUNTER — HOSPITAL ENCOUNTER
Dept: HOSPITAL 101 - WC | Age: 69
Discharge: HOME | End: 2024-04-09
Payer: MEDICARE

## 2024-04-09 DIAGNOSIS — I73.89: ICD-10-CM

## 2024-04-09 DIAGNOSIS — L97.522: ICD-10-CM

## 2024-04-09 DIAGNOSIS — L84: ICD-10-CM

## 2024-04-09 DIAGNOSIS — M79.671: Primary | ICD-10-CM

## 2024-04-09 PROCEDURE — G0463 HOSPITAL OUTPT CLINIC VISIT: HCPCS

## 2024-04-09 PROCEDURE — 73630 X-RAY EXAM OF FOOT: CPT

## 2024-04-15 ENCOUNTER — HOSPITAL ENCOUNTER
Dept: HOSPITAL 101 - CARD | Age: 69
Discharge: HOME | End: 2024-04-15
Payer: MEDICARE

## 2024-04-15 DIAGNOSIS — I50.22: Primary | ICD-10-CM

## 2024-04-15 DIAGNOSIS — I35.1: ICD-10-CM

## 2024-04-15 PROCEDURE — 93306 TTE W/DOPPLER COMPLETE: CPT

## 2024-04-15 PROCEDURE — 93356 MYOCRD STRAIN IMG SPCKL TRCK: CPT

## 2024-05-07 ENCOUNTER — HOSPITAL ENCOUNTER
Age: 69
Discharge: HOME | End: 2024-05-07
Payer: MEDICARE

## 2024-05-07 DIAGNOSIS — M10.9: ICD-10-CM

## 2024-05-07 DIAGNOSIS — M20.41: ICD-10-CM

## 2024-05-07 DIAGNOSIS — M20.22: ICD-10-CM

## 2024-05-07 DIAGNOSIS — I73.89: ICD-10-CM

## 2024-05-07 DIAGNOSIS — L84: Primary | ICD-10-CM

## 2024-05-07 DIAGNOSIS — M25.871: ICD-10-CM

## 2024-05-07 DIAGNOSIS — R60.9: ICD-10-CM

## 2024-05-07 PROCEDURE — G0463 HOSPITAL OUTPT CLINIC VISIT: HCPCS

## 2024-05-07 PROCEDURE — 73630 X-RAY EXAM OF FOOT: CPT

## 2024-05-20 ENCOUNTER — HOSPITAL ENCOUNTER
Dept: HOSPITAL 101 - LAB | Age: 69
Discharge: HOME | End: 2024-05-20
Payer: MEDICARE

## 2024-05-20 ENCOUNTER — HOSPITAL ENCOUNTER
Dept: HOSPITAL 101 - CT | Age: 69
Discharge: HOME | End: 2024-05-20
Payer: MEDICARE

## 2024-05-20 DIAGNOSIS — R73.09: ICD-10-CM

## 2024-05-20 DIAGNOSIS — Z12.5: ICD-10-CM

## 2024-05-20 DIAGNOSIS — E78.5: Primary | ICD-10-CM

## 2024-05-20 DIAGNOSIS — I70.262: Primary | ICD-10-CM

## 2024-05-20 LAB
ADD MANUAL DIFF: NO
ALANINE AMINOTRANSFERASE: 22 U/L (ref 16–63)
ALBUMIN GLOBULIN RATIO: 1
ALBUMIN LEVEL: 3.6 G/DL (ref 3.4–5)
ALKALINE PHOSPHATASE: 86 U/L (ref 46–116)
ANION GAP: 11.2
ASPARTATE AMINO TRANSFERASE: 12 U/L (ref 15–37)
BLOOD UREA NITROGEN: 19 MG/DL (ref 7–18)
CALCIUM: 9.2 MG/DL (ref 8.5–10.1)
CARBON DIOXIDE: 30.2 MMOL/L (ref 21–32)
CHLORIDE: 102 MMOL/L (ref 98–107)
CHOLESTEROL: 148 MG/DL (ref ?–200)
ESTIMATED GFR (AFRICAN AMERICA: >60 (ref 60–?)
ESTIMATED GFR (NON-AFRICAN AME: 56 (ref 60–?)
FREE T3: 2.83 PG/ML (ref 2.18–3.98)
GLOBULIN: 3.5 G/DL
GLUCOSE: 107 MG/DL (ref 74–106)
HDL CHOLESTEROL: 31 MG/DL (ref 40–60)
HEMATOCRIT: 34.5 % (ref 42–54)
HEMOGLOBIN: 11.6 G/DL (ref 14–18)
IMMATURE GRANULOCYTES ABS AUTO: 0.02 10^3/UL (ref 0–0.03)
IMMATURE GRANULOCYTES PCT AUTO: 0.3 % (ref 0–0.5)
LYMPHOCYTES  ABSOLUTE AUTO: 1.3 10^3/UL (ref 1.2–3.8)
MCV RBC: 95.6 FL (ref 80–94)
MEAN CORPUSCULAR HEMOGLOBIN: 32.1 PG (ref 25.9–34)
MEAN CORPUSCULAR HGB CONC: 33.6 G/DL (ref 29.9–35.2)
PLATELET COUNT: 137 10^3/UL (ref 150–450)
POTASSIUM: 4.4 MMOL/L (ref 3.5–5.1)
RED BLOOD COUNT: 3.61 10^6/UL (ref 4.7–6.1)
SODIUM: 139 MMOL/L (ref 136–145)
THYROID STIMULATING HORMONE: 4.71 UIU/ML (ref 0.36–3.74)
TOTAL PROTEIN: 7.1 G/DL (ref 6.4–8.2)
TRIGLYCERIDES: 156 MG/DL (ref ?–150)
URIC ACID: 10.2 MG/DL (ref 3.5–7.2)
VLDL CHOLESTEROL: 31.2 MG/DL
WHITE BLOOD COUNT: 6.5 10^3/UL (ref 4–11)

## 2024-05-20 PROCEDURE — 36415 COLL VENOUS BLD VENIPUNCTURE: CPT

## 2024-05-20 PROCEDURE — 84481 FREE ASSAY (FT-3): CPT

## 2024-05-20 PROCEDURE — G0103 PSA SCREENING: HCPCS

## 2024-05-20 PROCEDURE — 85025 COMPLETE CBC W/AUTO DIFF WBC: CPT

## 2024-05-20 PROCEDURE — 84436 ASSAY OF TOTAL THYROXINE: CPT

## 2024-05-20 PROCEDURE — 83525 ASSAY OF INSULIN: CPT

## 2024-05-20 PROCEDURE — 80061 LIPID PANEL: CPT

## 2024-05-20 PROCEDURE — 75635 CT ANGIO ABDOMINAL ARTERIES: CPT

## 2024-05-20 PROCEDURE — 84443 ASSAY THYROID STIM HORMONE: CPT

## 2024-05-20 PROCEDURE — 84550 ASSAY OF BLOOD/URIC ACID: CPT

## 2024-05-20 PROCEDURE — 83036 HEMOGLOBIN GLYCOSYLATED A1C: CPT

## 2024-05-20 PROCEDURE — 80053 COMPREHEN METABOLIC PANEL: CPT

## 2024-05-29 ENCOUNTER — HOSPITAL ENCOUNTER
Dept: HOSPITAL 101 - US | Age: 69
Discharge: HOME | End: 2024-05-29
Payer: MEDICARE

## 2024-05-29 DIAGNOSIS — R10.9: Primary | ICD-10-CM

## 2024-05-29 DIAGNOSIS — K80.20: ICD-10-CM

## 2024-05-29 PROCEDURE — 76705 ECHO EXAM OF ABDOMEN: CPT

## 2024-06-12 ENCOUNTER — HOSPITAL ENCOUNTER
Dept: HOSPITAL 101 - VC | Age: 69
Discharge: HOME | End: 2024-06-12
Payer: MEDICARE

## 2024-06-12 DIAGNOSIS — I70.262: Primary | ICD-10-CM

## 2024-06-12 PROCEDURE — 93923 UPR/LXTR ART STDY 3+ LVLS: CPT

## 2024-06-18 ENCOUNTER — HOSPITAL ENCOUNTER
Dept: HOSPITAL 101 - WC | Age: 69
Discharge: HOME | End: 2024-06-18
Payer: MEDICARE

## 2024-06-18 DIAGNOSIS — L97.522: ICD-10-CM

## 2024-06-18 DIAGNOSIS — M79.671: Primary | ICD-10-CM

## 2024-06-18 PROCEDURE — 73630 X-RAY EXAM OF FOOT: CPT

## 2024-06-18 PROCEDURE — G0463 HOSPITAL OUTPT CLINIC VISIT: HCPCS

## 2024-07-30 ENCOUNTER — HOSPITAL ENCOUNTER
Dept: HOSPITAL 101 - WC | Age: 69
Discharge: HOME | End: 2024-07-30
Payer: MEDICARE

## 2024-07-30 ENCOUNTER — HOSPITAL ENCOUNTER
Dept: HOSPITAL 101 - PST | Age: 69
Discharge: HOME | End: 2024-07-30
Payer: MEDICARE

## 2024-07-30 ENCOUNTER — HOSPITAL ENCOUNTER
Dept: HOSPITAL 101 - LAB | Age: 69
Discharge: HOME | End: 2024-07-30
Payer: MEDICARE

## 2024-07-30 DIAGNOSIS — I50.22: Primary | ICD-10-CM

## 2024-07-30 DIAGNOSIS — Z86.010: ICD-10-CM

## 2024-07-30 DIAGNOSIS — M79.671: Primary | ICD-10-CM

## 2024-07-30 DIAGNOSIS — K29.50: ICD-10-CM

## 2024-07-30 DIAGNOSIS — D64.9: ICD-10-CM

## 2024-07-30 DIAGNOSIS — L97.522: ICD-10-CM

## 2024-07-30 DIAGNOSIS — Z98.890: ICD-10-CM

## 2024-07-30 DIAGNOSIS — Z01.818: Primary | ICD-10-CM

## 2024-07-30 DIAGNOSIS — M10.9: ICD-10-CM

## 2024-07-30 LAB
ANION GAP: 11.3
BLOOD UREA NITROGEN: 12 MG/DL (ref 7–18)
CALCIUM: 9 MG/DL (ref 8.5–10.1)
CARBON DIOXIDE: 28.1 MMOL/L (ref 21–32)
CHLORIDE: 101 MMOL/L (ref 98–107)
CO2 BLD-SCNC: 28.1 MMOL/L (ref 21–32)
ESTIMATED GFR (AFRICAN AMERICA: >60 (ref 60–?)
ESTIMATED GFR (NON-AFRICAN AME: >60 (ref 60–?)
GLUCOSE BLD-MCNC: 95 MG/DL (ref 74–106)
POTASSIUM SERPLBLD-SCNC: 4.4 MMOL/L (ref 3.5–5.1)
POTASSIUM: 4.4 MMOL/L (ref 3.5–5.1)
SODIUM BLD-SCNC: 136 MMOL/L (ref 136–145)
SODIUM: 136 MMOL/L (ref 136–145)
URIC ACID: 7.9 MG/DL (ref 3.5–7.2)

## 2024-07-30 PROCEDURE — 11043 DBRDMT MUSC&/FSCA 1ST 20/<: CPT

## 2024-07-30 PROCEDURE — 36415 COLL VENOUS BLD VENIPUNCTURE: CPT

## 2024-07-30 PROCEDURE — 80048 BASIC METABOLIC PNL TOTAL CA: CPT

## 2024-07-30 PROCEDURE — 73630 X-RAY EXAM OF FOOT: CPT

## 2024-07-30 PROCEDURE — 84550 ASSAY OF BLOOD/URIC ACID: CPT

## 2024-08-07 ENCOUNTER — HOSPITAL ENCOUNTER (OUTPATIENT)
Age: 69
Discharge: HOME | End: 2024-08-07
Payer: MEDICARE

## 2024-08-07 VITALS — DIASTOLIC BLOOD PRESSURE: 42 MMHG | HEART RATE: 54 BPM | SYSTOLIC BLOOD PRESSURE: 110 MMHG | OXYGEN SATURATION: 97 %

## 2024-08-07 VITALS — DIASTOLIC BLOOD PRESSURE: 56 MMHG | HEART RATE: 55 BPM | SYSTOLIC BLOOD PRESSURE: 105 MMHG | OXYGEN SATURATION: 94 %

## 2024-08-07 VITALS
HEART RATE: 64 BPM | TEMPERATURE: 97.16 F | SYSTOLIC BLOOD PRESSURE: 120 MMHG | DIASTOLIC BLOOD PRESSURE: 60 MMHG | OXYGEN SATURATION: 94 %

## 2024-08-07 VITALS — BODY MASS INDEX: 36.7 KG/M2

## 2024-08-07 DIAGNOSIS — Z92.3: ICD-10-CM

## 2024-08-07 DIAGNOSIS — I11.0: ICD-10-CM

## 2024-08-07 DIAGNOSIS — Z87.891: ICD-10-CM

## 2024-08-07 DIAGNOSIS — D12.4: ICD-10-CM

## 2024-08-07 DIAGNOSIS — Z79.01: ICD-10-CM

## 2024-08-07 DIAGNOSIS — I10: ICD-10-CM

## 2024-08-07 DIAGNOSIS — Z85.828: ICD-10-CM

## 2024-08-07 DIAGNOSIS — I50.9: ICD-10-CM

## 2024-08-07 DIAGNOSIS — K29.50: Primary | ICD-10-CM

## 2024-08-07 DIAGNOSIS — Z99.81: ICD-10-CM

## 2024-08-07 DIAGNOSIS — D64.9: ICD-10-CM

## 2024-08-07 DIAGNOSIS — G47.33: ICD-10-CM

## 2024-08-07 DIAGNOSIS — I25.10: ICD-10-CM

## 2024-08-07 DIAGNOSIS — I48.91: ICD-10-CM

## 2024-08-07 DIAGNOSIS — D12.0: ICD-10-CM

## 2024-08-07 DIAGNOSIS — J44.9: ICD-10-CM

## 2024-08-07 DIAGNOSIS — Z79.02: ICD-10-CM

## 2024-08-07 DIAGNOSIS — Z86.010: ICD-10-CM

## 2024-08-07 PROCEDURE — 45385 COLONOSCOPY W/LESION REMOVAL: CPT

## 2024-08-07 PROCEDURE — 43239 EGD BIOPSY SINGLE/MULTIPLE: CPT

## 2024-08-07 PROCEDURE — 88305 TISSUE EXAM BY PATHOLOGIST: CPT

## 2024-09-10 ENCOUNTER — HOSPITAL ENCOUNTER
Dept: HOSPITAL 101 - WC | Age: 69
Discharge: HOME | End: 2024-09-10
Payer: MEDICARE

## 2024-09-10 DIAGNOSIS — M79.671: ICD-10-CM

## 2024-09-10 DIAGNOSIS — L97.522: Primary | ICD-10-CM

## 2024-09-10 PROCEDURE — 73630 X-RAY EXAM OF FOOT: CPT

## 2024-09-10 PROCEDURE — G0463 HOSPITAL OUTPT CLINIC VISIT: HCPCS

## 2024-09-23 ENCOUNTER — HOSPITAL ENCOUNTER
Dept: HOSPITAL 101 - WC | Age: 69
Discharge: HOME | End: 2024-09-23
Payer: MEDICARE

## 2024-09-23 DIAGNOSIS — L97.522: Primary | ICD-10-CM

## 2024-09-23 PROCEDURE — G0463 HOSPITAL OUTPT CLINIC VISIT: HCPCS

## 2024-10-14 ENCOUNTER — HOSPITAL ENCOUNTER
Dept: HOSPITAL 101 - RAD | Age: 69
Discharge: HOME | End: 2024-10-14
Payer: MEDICARE

## 2024-10-14 DIAGNOSIS — Z98.1: Primary | ICD-10-CM

## 2024-10-14 PROCEDURE — 73630 X-RAY EXAM OF FOOT: CPT

## 2024-10-15 ENCOUNTER — HOSPITAL ENCOUNTER
Dept: HOSPITAL 101 - WC | Age: 69
Discharge: HOME | End: 2024-10-15
Payer: MEDICARE

## 2024-10-15 DIAGNOSIS — L97.522: Primary | ICD-10-CM

## 2024-10-15 PROCEDURE — G0463 HOSPITAL OUTPT CLINIC VISIT: HCPCS

## 2024-12-04 ENCOUNTER — HOSPITAL ENCOUNTER
Dept: HOSPITAL 101 - WC | Age: 69
Discharge: HOME | End: 2024-12-04
Payer: MEDICARE

## 2024-12-04 DIAGNOSIS — L60.9: ICD-10-CM

## 2024-12-04 DIAGNOSIS — I73.9: ICD-10-CM

## 2024-12-04 DIAGNOSIS — L60.8: ICD-10-CM

## 2024-12-04 DIAGNOSIS — L97.522: Primary | ICD-10-CM

## 2024-12-04 PROCEDURE — 11721 DEBRIDE NAIL 6 OR MORE: CPT

## 2024-12-16 ENCOUNTER — HOSPITAL ENCOUNTER
Dept: HOSPITAL 101 - CT | Age: 69
Discharge: HOME | End: 2024-12-16
Payer: MEDICARE

## 2024-12-16 DIAGNOSIS — Z87.891: Primary | ICD-10-CM

## 2024-12-16 PROCEDURE — 71271 CT THORAX LUNG CANCER SCR C-: CPT

## 2025-03-14 ENCOUNTER — HOSPITAL ENCOUNTER
Dept: HOSPITAL 101 - CARD | Age: 70
Discharge: HOME | End: 2025-03-14
Payer: MEDICARE

## 2025-03-14 DIAGNOSIS — I35.1: ICD-10-CM

## 2025-03-14 DIAGNOSIS — I50.22: Primary | ICD-10-CM

## 2025-03-14 PROCEDURE — 93306 TTE W/DOPPLER COMPLETE: CPT

## 2025-04-14 ENCOUNTER — HOSPITAL ENCOUNTER
Dept: HOSPITAL 101 - CARD | Age: 70
Discharge: HOME | End: 2025-04-14
Payer: MEDICARE

## 2025-04-14 DIAGNOSIS — I25.118: Primary | ICD-10-CM

## 2025-04-14 DIAGNOSIS — I50.22: ICD-10-CM

## 2025-04-14 DIAGNOSIS — R06.02: ICD-10-CM

## 2025-04-14 LAB
ADD MANUAL DIFF: NO
ALANINE AMINOTRANSFERASE: 27 U/L (ref 16–63)
ALBUMIN GLOBULIN RATIO: 1.2
ALBUMIN LEVEL: 3.8 G/DL (ref 3.4–5)
ALKALINE PHOSPHATASE: 112 U/L (ref 46–116)
ANION GAP: 11.6
ASPARTATE AMINO TRANSFERASE: 23 U/L (ref 15–37)
CALCIUM: 9.3 MG/DL (ref 8.5–10.1)
CHLORIDE: 105 MMOL/L (ref 98–107)
CHOLESTEROL: 144 MG/DL (ref ?–200)
CO2 BLD-SCNC: 28.5 MMOL/L (ref 21–32)
ESTIMATED GFR (AFRICAN AMERICA: >60
ESTIMATED GFR (NON-AFRICAN AME: 54
GLOBULIN: 3.2 G/DL
GLUCOSE SERPLBLD-MCNC: 115 MG/DL (ref 74–106)
HCT VFR BLD CALC: 40.1 % (ref 42–54)
HDL CHOLESTEROL: 32 MG/DL (ref 40–60)
HEMOGLOBIN: 13.6 G/DL (ref 14–18)
IMMATURE GRANULOCYTES ABS AUTO: 0.02 10^3/UL (ref 0–0.03)
IMMATURE GRANULOCYTES PCT AUTO: 0.3 % (ref 0–0.5)
LYMPHOCYTES  ABSOLUTE AUTO: 1.4 10^3/UL (ref 1.2–3.8)
MCH RBC QN AUTO: 32.9 PG (ref 25.9–34)
MCV RBC: 96.9 FL (ref 80–94)
MEAN CORPUSCULAR HGB CONC: 33.9 G/DL (ref 29.9–35.2)
PLATELET # BLD: 144 10^3/UL (ref 150–450)
POTASSIUM SERPLBLD-SCNC: 4.1 MMOL/L (ref 3.5–5.1)
RBC # BLD AUTO: 4.14 10^6/UL (ref 4.7–6.1)
SODIUM BLD-SCNC: 141 MMOL/L (ref 136–145)
TRIGLYCERIDES: 195 MG/DL (ref ?–150)
WBC # BLD: 6.3 10^3/UL (ref 4–11)

## 2025-04-14 PROCEDURE — 80061 LIPID PANEL: CPT

## 2025-04-14 PROCEDURE — 36415 COLL VENOUS BLD VENIPUNCTURE: CPT

## 2025-04-14 PROCEDURE — 80053 COMPREHEN METABOLIC PANEL: CPT

## 2025-04-14 PROCEDURE — 85025 COMPLETE CBC W/AUTO DIFF WBC: CPT

## 2025-04-14 PROCEDURE — 78452 HT MUSCLE IMAGE SPECT MULT: CPT

## 2025-04-14 PROCEDURE — A9500 TC99M SESTAMIBI: HCPCS

## 2025-04-14 PROCEDURE — 93017 CV STRESS TEST TRACING ONLY: CPT

## 2025-04-14 RX ADMIN — REGADENOSON 0.4 MG: 0.08 INJECTION, SOLUTION INTRAVENOUS at 09:48

## 2025-04-15 ENCOUNTER — HOSPITAL ENCOUNTER
Dept: HOSPITAL 101 - NM | Age: 70
Discharge: HOME | End: 2025-04-15
Payer: MEDICARE

## 2025-04-15 DIAGNOSIS — R06.02: ICD-10-CM

## 2025-04-15 DIAGNOSIS — I25.118: Primary | ICD-10-CM

## 2025-06-10 PROCEDURE — 93010 ELECTROCARDIOGRAM REPORT: CPT | Performed by: INTERNAL MEDICINE

## 2025-06-11 PROCEDURE — 93010 ELECTROCARDIOGRAM REPORT: CPT | Performed by: INTERNAL MEDICINE

## 2025-07-10 ENCOUNTER — HOSPITAL ENCOUNTER (INPATIENT)
Age: 70
LOS: 8 days | Discharge: HOME | End: 2025-07-18
Attending: INTERNAL MEDICINE | Admitting: INTERNAL MEDICINE
Payer: MEDICARE

## 2025-07-10 DIAGNOSIS — Z95.1 S/P CABG (CORONARY ARTERY BYPASS GRAFT): Primary | ICD-10-CM

## 2025-07-10 PROBLEM — F17.210 CIGARETTE NICOTINE DEPENDENCE, UNCOMPLICATED: Status: ACTIVE | Noted: 2020-08-11

## 2025-07-10 PROBLEM — R53.1 WEAKNESS: Status: ACTIVE | Noted: 2025-07-10

## 2025-07-10 PROCEDURE — 6370000000 HC RX 637 (ALT 250 FOR IP): Performed by: INTERNAL MEDICINE

## 2025-07-10 PROCEDURE — 2700000000 HC OXYGEN THERAPY PER DAY

## 2025-07-10 PROCEDURE — 6360000002 HC RX W HCPCS: Performed by: INTERNAL MEDICINE

## 2025-07-10 PROCEDURE — 94664 DEMO&/EVAL PT USE INHALER: CPT

## 2025-07-10 PROCEDURE — 94761 N-INVAS EAR/PLS OXIMETRY MLT: CPT

## 2025-07-10 PROCEDURE — 1200000002 HC SEMI PRIVATE SWING BED

## 2025-07-10 RX ORDER — IPRATROPIUM BROMIDE AND ALBUTEROL SULFATE 2.5; .5 MG/3ML; MG/3ML
1 SOLUTION RESPIRATORY (INHALATION) 3 TIMES DAILY
Status: DISCONTINUED | OUTPATIENT
Start: 2025-07-11 | End: 2025-07-18 | Stop reason: HOSPADM

## 2025-07-10 RX ORDER — TORSEMIDE 10 MG/1
40 TABLET ORAL DAILY
Status: DISCONTINUED | OUTPATIENT
Start: 2025-07-11 | End: 2025-07-18 | Stop reason: HOSPADM

## 2025-07-10 RX ORDER — SACUBITRIL AND VALSARTAN 24; 26 MG/1; MG/1
0.5 TABLET, FILM COATED ORAL 2 TIMES DAILY
Status: DISCONTINUED | OUTPATIENT
Start: 2025-07-10 | End: 2025-07-18 | Stop reason: HOSPADM

## 2025-07-10 RX ORDER — PANTOPRAZOLE SODIUM 40 MG/1
40 TABLET, DELAYED RELEASE ORAL DAILY
COMMUNITY
Start: 2024-08-20

## 2025-07-10 RX ORDER — DAPAGLIFLOZIN 10 MG/1
10 TABLET, FILM COATED ORAL DAILY
COMMUNITY
Start: 2025-04-07

## 2025-07-10 RX ORDER — BUMETANIDE 1 MG/1
1 TABLET ORAL
Status: ON HOLD | COMMUNITY
Start: 2024-09-19 | End: 2025-07-18 | Stop reason: HOSPADM

## 2025-07-10 RX ORDER — ACETAMINOPHEN 325 MG/1
650 TABLET ORAL EVERY 6 HOURS PRN
Status: DISCONTINUED | OUTPATIENT
Start: 2025-07-10 | End: 2025-07-18 | Stop reason: HOSPADM

## 2025-07-10 RX ORDER — ALLOPURINOL 100 MG/1
100 TABLET ORAL DAILY
Status: DISCONTINUED | OUTPATIENT
Start: 2025-07-11 | End: 2025-07-18 | Stop reason: HOSPADM

## 2025-07-10 RX ORDER — OXYCODONE HYDROCHLORIDE 5 MG/1
5 TABLET ORAL EVERY 6 HOURS PRN
Status: DISPENSED | OUTPATIENT
Start: 2025-07-10 | End: 2025-07-17

## 2025-07-10 RX ORDER — AMIODARONE HYDROCHLORIDE 200 MG/1
200 TABLET ORAL DAILY
Status: DISCONTINUED | OUTPATIENT
Start: 2025-07-11 | End: 2025-07-18 | Stop reason: HOSPADM

## 2025-07-10 RX ORDER — ATORVASTATIN CALCIUM 40 MG/1
40 TABLET, FILM COATED ORAL NIGHTLY
Status: DISCONTINUED | OUTPATIENT
Start: 2025-07-10 | End: 2025-07-18 | Stop reason: HOSPADM

## 2025-07-10 RX ORDER — PANTOPRAZOLE SODIUM 40 MG/1
40 TABLET, DELAYED RELEASE ORAL
Status: DISCONTINUED | OUTPATIENT
Start: 2025-07-11 | End: 2025-07-18 | Stop reason: HOSPADM

## 2025-07-10 RX ORDER — CHLORHEXIDINE GLUCONATE ORAL RINSE 1.2 MG/ML
15 SOLUTION DENTAL 2 TIMES DAILY
Status: DISCONTINUED | OUTPATIENT
Start: 2025-07-10 | End: 2025-07-14

## 2025-07-10 RX ORDER — TRAZODONE HYDROCHLORIDE 50 MG/1
50 TABLET ORAL NIGHTLY
Status: DISCONTINUED | OUTPATIENT
Start: 2025-07-10 | End: 2025-07-18 | Stop reason: HOSPADM

## 2025-07-10 RX ORDER — DIGOXIN 125 MCG
125 TABLET ORAL DAILY
Status: DISCONTINUED | OUTPATIENT
Start: 2025-07-11 | End: 2025-07-18 | Stop reason: HOSPADM

## 2025-07-10 RX ORDER — LANOLIN ALCOHOL/MO/W.PET/CERES
400 CREAM (GRAM) TOPICAL NIGHTLY
Status: DISCONTINUED | OUTPATIENT
Start: 2025-07-10 | End: 2025-07-18 | Stop reason: HOSPADM

## 2025-07-10 RX ORDER — METOPROLOL SUCCINATE 50 MG/1
50 TABLET, EXTENDED RELEASE ORAL 2 TIMES DAILY
Status: DISCONTINUED | OUTPATIENT
Start: 2025-07-10 | End: 2025-07-13

## 2025-07-10 RX ORDER — DAPAGLIFLOZIN 5 MG/1
10 TABLET, FILM COATED ORAL EVERY MORNING
Status: DISCONTINUED | OUTPATIENT
Start: 2025-07-11 | End: 2025-07-10 | Stop reason: CLARIF

## 2025-07-10 RX ORDER — SENNA AND DOCUSATE SODIUM 50; 8.6 MG/1; MG/1
2 TABLET, FILM COATED ORAL 2 TIMES DAILY PRN
Status: DISCONTINUED | OUTPATIENT
Start: 2025-07-10 | End: 2025-07-18 | Stop reason: HOSPADM

## 2025-07-10 RX ORDER — GABAPENTIN 100 MG/1
100 CAPSULE ORAL EVERY 12 HOURS SCHEDULED
Status: COMPLETED | OUTPATIENT
Start: 2025-07-10 | End: 2025-07-17

## 2025-07-10 RX ORDER — BUDESONIDE 0.25 MG/2ML
250 INHALANT ORAL 2 TIMES DAILY
Status: DISCONTINUED | OUTPATIENT
Start: 2025-07-10 | End: 2025-07-18 | Stop reason: HOSPADM

## 2025-07-10 RX ORDER — IPRATROPIUM BROMIDE AND ALBUTEROL SULFATE 2.5; .5 MG/3ML; MG/3ML
1 SOLUTION RESPIRATORY (INHALATION)
Status: DISCONTINUED | OUTPATIENT
Start: 2025-07-10 | End: 2025-07-10

## 2025-07-10 RX ORDER — ALLOPURINOL 100 MG/1
100 TABLET ORAL DAILY
COMMUNITY
Start: 2025-07-11

## 2025-07-10 RX ORDER — ASPIRIN 81 MG/1
81 TABLET, CHEWABLE ORAL DAILY
Status: DISCONTINUED | OUTPATIENT
Start: 2025-07-11 | End: 2025-07-18 | Stop reason: HOSPADM

## 2025-07-10 RX ADMIN — APIXABAN 5 MG: 5 TABLET, FILM COATED ORAL at 22:34

## 2025-07-10 RX ADMIN — GABAPENTIN 100 MG: 100 CAPSULE ORAL at 22:34

## 2025-07-10 RX ADMIN — SACUBITRIL AND VALSARTAN 0.5 TABLET: 24; 26 TABLET, FILM COATED ORAL at 22:26

## 2025-07-10 RX ADMIN — Medication 400 MG: at 22:35

## 2025-07-10 RX ADMIN — ATORVASTATIN CALCIUM 40 MG: 40 TABLET, FILM COATED ORAL at 22:34

## 2025-07-10 RX ADMIN — PSYLLIUM HUSK 1 PACKET: 3.4 POWDER ORAL at 22:26

## 2025-07-10 RX ADMIN — OXYCODONE 5 MG: 5 TABLET ORAL at 22:36

## 2025-07-10 RX ADMIN — METOPROLOL SUCCINATE 50 MG: 50 TABLET, EXTENDED RELEASE ORAL at 22:34

## 2025-07-10 RX ADMIN — TRAZODONE HYDROCHLORIDE 50 MG: 50 TABLET ORAL at 22:34

## 2025-07-10 RX ADMIN — IPRATROPIUM BROMIDE AND ALBUTEROL SULFATE 1 DOSE: .5; 3 SOLUTION RESPIRATORY (INHALATION) at 20:59

## 2025-07-10 RX ADMIN — BUDESONIDE 250 MCG: 0.25 SUSPENSION RESPIRATORY (INHALATION) at 20:59

## 2025-07-10 ASSESSMENT — PAIN SCALES - GENERAL
PAINLEVEL_OUTOF10: 5
PAINLEVEL_OUTOF10: 0

## 2025-07-10 ASSESSMENT — LIFESTYLE VARIABLES
HOW MANY STANDARD DRINKS CONTAINING ALCOHOL DO YOU HAVE ON A TYPICAL DAY: 1 OR 2
HOW OFTEN DO YOU HAVE A DRINK CONTAINING ALCOHOL: 2-4 TIMES A MONTH

## 2025-07-10 ASSESSMENT — PAIN DESCRIPTION - LOCATION: LOCATION: CHEST

## 2025-07-10 NOTE — PROGRESS NOTES
Pt arrived to floor via stretcher from [facility] and ambulated/transfered to bed. Gait steady. Admission and assessment initiated. See doc flowsheet and admission tab. POC and education initiated and reviewed with patient/family.Educated on use of call light, phone, bed and within pt's reach. Denied further needs or questions at this time. Will continue to monitor.   Oriented to room and safety measures.  Verbalizes comfort.  Admission assessment partially completed.  Pt given supper tray.

## 2025-07-11 PROCEDURE — 6360000002 HC RX W HCPCS: Performed by: INTERNAL MEDICINE

## 2025-07-11 PROCEDURE — 6370000000 HC RX 637 (ALT 250 FOR IP): Performed by: INTERNAL MEDICINE

## 2025-07-11 PROCEDURE — 94761 N-INVAS EAR/PLS OXIMETRY MLT: CPT

## 2025-07-11 PROCEDURE — 94669 MECHANICAL CHEST WALL OSCILL: CPT

## 2025-07-11 PROCEDURE — 2700000000 HC OXYGEN THERAPY PER DAY

## 2025-07-11 PROCEDURE — 94640 AIRWAY INHALATION TREATMENT: CPT

## 2025-07-11 PROCEDURE — 97110 THERAPEUTIC EXERCISES: CPT

## 2025-07-11 PROCEDURE — 97162 PT EVAL MOD COMPLEX 30 MIN: CPT

## 2025-07-11 PROCEDURE — 1200000002 HC SEMI PRIVATE SWING BED

## 2025-07-11 PROCEDURE — 97116 GAIT TRAINING THERAPY: CPT

## 2025-07-11 PROCEDURE — 97166 OT EVAL MOD COMPLEX 45 MIN: CPT

## 2025-07-11 PROCEDURE — 5A09357 ASSISTANCE WITH RESPIRATORY VENTILATION, LESS THAN 24 CONSECUTIVE HOURS, CONTINUOUS POSITIVE AIRWAY PRESSURE: ICD-10-PCS | Performed by: INTERNAL MEDICINE

## 2025-07-11 RX ORDER — MUPIROCIN 2 %
OINTMENT (GRAM) TOPICAL 2 TIMES DAILY
Status: DISCONTINUED | OUTPATIENT
Start: 2025-07-11 | End: 2025-07-18 | Stop reason: HOSPADM

## 2025-07-11 RX ORDER — SACUBITRIL AND VALSARTAN 97; 103 MG/1; MG/1
1 TABLET, FILM COATED ORAL 2 TIMES DAILY
Status: ON HOLD | COMMUNITY
Start: 2025-05-14 | End: 2025-07-18 | Stop reason: HOSPADM

## 2025-07-11 RX ORDER — CHLORHEXIDINE GLUCONATE ORAL RINSE 1.2 MG/ML
15 SOLUTION DENTAL 2 TIMES DAILY
Qty: 420 ML | Refills: 0 | Status: SHIPPED | OUTPATIENT
Start: 2025-07-11 | End: 2025-07-25

## 2025-07-11 RX ORDER — ISOSORBIDE MONONITRATE 60 MG/1
60 TABLET, EXTENDED RELEASE ORAL NIGHTLY
Status: ON HOLD | COMMUNITY
Start: 2024-11-14 | End: 2025-07-18 | Stop reason: HOSPADM

## 2025-07-11 RX ADMIN — METOPROLOL SUCCINATE 50 MG: 50 TABLET, EXTENDED RELEASE ORAL at 21:44

## 2025-07-11 RX ADMIN — ASPIRIN 81 MG: 81 TABLET, CHEWABLE ORAL at 08:48

## 2025-07-11 RX ADMIN — IPRATROPIUM BROMIDE AND ALBUTEROL SULFATE 1 DOSE: .5; 3 SOLUTION RESPIRATORY (INHALATION) at 10:02

## 2025-07-11 RX ADMIN — SACUBITRIL AND VALSARTAN 0.5 TABLET: 24; 26 TABLET, FILM COATED ORAL at 21:43

## 2025-07-11 RX ADMIN — IPRATROPIUM BROMIDE AND ALBUTEROL SULFATE 1 DOSE: .5; 3 SOLUTION RESPIRATORY (INHALATION) at 21:27

## 2025-07-11 RX ADMIN — AMIODARONE HYDROCHLORIDE 200 MG: 200 TABLET ORAL at 08:48

## 2025-07-11 RX ADMIN — METOPROLOL SUCCINATE 50 MG: 50 TABLET, EXTENDED RELEASE ORAL at 08:48

## 2025-07-11 RX ADMIN — APIXABAN 5 MG: 5 TABLET, FILM COATED ORAL at 08:48

## 2025-07-11 RX ADMIN — IPRATROPIUM BROMIDE AND ALBUTEROL SULFATE 1 DOSE: .5; 3 SOLUTION RESPIRATORY (INHALATION) at 16:00

## 2025-07-11 RX ADMIN — PANTOPRAZOLE SODIUM 40 MG: 40 TABLET, DELAYED RELEASE ORAL at 06:19

## 2025-07-11 RX ADMIN — SACUBITRIL AND VALSARTAN 0.5 TABLET: 24; 26 TABLET, FILM COATED ORAL at 08:48

## 2025-07-11 RX ADMIN — ATORVASTATIN CALCIUM 40 MG: 40 TABLET, FILM COATED ORAL at 21:44

## 2025-07-11 RX ADMIN — MUPIROCIN: 20 OINTMENT TOPICAL at 08:48

## 2025-07-11 RX ADMIN — EMPAGLIFLOZIN 10 MG: 10 TABLET, FILM COATED ORAL at 08:48

## 2025-07-11 RX ADMIN — BUDESONIDE 250 MCG: 0.25 SUSPENSION RESPIRATORY (INHALATION) at 10:02

## 2025-07-11 RX ADMIN — MUPIROCIN: 20 OINTMENT TOPICAL at 21:44

## 2025-07-11 RX ADMIN — OXYCODONE 5 MG: 5 TABLET ORAL at 21:43

## 2025-07-11 RX ADMIN — GABAPENTIN 100 MG: 100 CAPSULE ORAL at 21:43

## 2025-07-11 RX ADMIN — BUDESONIDE 250 MCG: 0.25 SUSPENSION RESPIRATORY (INHALATION) at 21:27

## 2025-07-11 RX ADMIN — GABAPENTIN 100 MG: 100 CAPSULE ORAL at 08:47

## 2025-07-11 RX ADMIN — APIXABAN 5 MG: 5 TABLET, FILM COATED ORAL at 21:44

## 2025-07-11 RX ADMIN — Medication 400 MG: at 21:44

## 2025-07-11 RX ADMIN — TORSEMIDE 40 MG: 10 TABLET ORAL at 08:47

## 2025-07-11 RX ADMIN — PSYLLIUM HUSK 1 PACKET: 3.4 POWDER ORAL at 08:47

## 2025-07-11 RX ADMIN — TRAZODONE HYDROCHLORIDE 50 MG: 50 TABLET ORAL at 21:44

## 2025-07-11 RX ADMIN — DIGOXIN 125 MCG: 125 TABLET ORAL at 08:48

## 2025-07-11 RX ADMIN — ALLOPURINOL 100 MG: 100 TABLET ORAL at 08:48

## 2025-07-11 ASSESSMENT — PAIN - FUNCTIONAL ASSESSMENT: PAIN_FUNCTIONAL_ASSESSMENT: ACTIVITIES ARE NOT PREVENTED

## 2025-07-11 ASSESSMENT — PAIN DESCRIPTION - ORIENTATION: ORIENTATION: OUTER

## 2025-07-11 ASSESSMENT — PAIN SCALES - GENERAL
PAINLEVEL_OUTOF10: 0
PAINLEVEL_OUTOF10: 6

## 2025-07-11 ASSESSMENT — PAIN DESCRIPTION - DESCRIPTORS: DESCRIPTORS: STABBING

## 2025-07-11 ASSESSMENT — PAIN DESCRIPTION - LOCATION: LOCATION: CHEST;INCISION

## 2025-07-11 NOTE — PROGRESS NOTES
eSWING BED ORIENTATION    Patient Name: Mike Hensley  : 1955   Gender: male   Diagnosis:  Weakness [R53.1] MRN: [id number]     [x] Goal is to provide you with very good care    [x] Insurance and Financial Responsibilities    [x] Changes to Expect           - Safely encourage you to learn to care for yourself     - Frequency of Doctor's Visits       - Family Training Likely  [x] Visiting Hours:           11:00am - 8:30 pm (or by special arrangement)  [x] Personal Phone Number          Located on Dry-Erase Board   [x] Swing Bed Team Meetings         Family encouraged to attend   [x]  Clothing            Bring what you normally wear  [x]  Prevention of Falls           Put call light on, and wait until OK'd to get up on your own.  [x] Therapy Expectations          Do your best to participate  [x]  Advanced Directives                          [x]  Pt has advanced directives and we have a copy on file                            []  Pt has advanced directives and we do not have a copy on file,  notified and will follow up.                             []   Pt does not have advanced directives.  [x] Educated on mail policy           Address provided for direct to hospital service                                                                                                                                                                                                                                                                                       Orientation Completed and agreed upon with Mike Hensley and/or Family Members

## 2025-07-11 NOTE — H&P
History & Physical    Patient:  Mike Hensley  YOB: 1955  Date of Service: 7/11/2025  MRN: [id number]   Acct:   [id number]   Primary Care Physician: Cheyenne Arteaga APRN - CNP    Chief Complaint: Generalized weakness, s/p CABG        History obtained from chart review and the patient.     The patient is a 70 y.o. male with h/o severe CAD, CHF with reduced EF NYHA class III , moderate Aortic regurgitation, PAF, HTN, PVD with stenting  COPD admitted to swing bed for PT/OT due to  generalized weakness .   He was having worsening SOB and had an abnormal Lexiscan test on 4/14/25 consistent with inferior infarct and moderate grace-infarct perfusion defect with concern for ischemia. Subsequently he had an a cardiac cath at [facility]  on 5/8/25.  He was found to have severe RCA lesion and lesion in the LAD.     Patient was admitted to hospitalist services for further evaluation and management.     Patient was seen by cardiothoracic surgery  Patient also underwent JN on 5/9. EF was 35% and moderate aortic regurgitation.    Patient was recommended to undergo CABG. However he demanded to be discharged as AMA.   He was then referred to [facility] for CABG due to being high risk. Was scheduled for 6/25. Then he developed ICD discharge and went to [facility] for evaluation on 6/9. Was started on Heparin drip and eventually transferred to [facility].      Echo on  6/16/25: LVEF 40% with normal RV function, mod-severe AI.      On 6/20/25 underwent CABG X3  (LIMA to LAD, SVG as a sequential graft to PDA and OM2) and AVR   Postoperatively developed left pleural effusion requiring thoracentesis on 7/7  Had difficult to control Afib.  Received multiple doses of Digoxin and Amiodarone. S/P  DCCV to junctional rhythm on 6/25. Remains in A-fib with very difficult to control rate & marginal BP. Meds were adjusted.   Developed Right arm thrombophlebitis after Amio infiltration and completed 7 days of empiric Keflex on 6/28/2025  The patient was prescribed or is already taking an ACE or ARB.  ( )  Reason why ACE or ARB was not prescribed / taking:       BETA-BLOCKERS:  Must Document which Beta-blocker is / was used:  ( x)  The patient was prescribed or is already taking a Beta-blocker (bisoprolol, carvedilol, or sustained release metoprolol succinate).  ( )  Reason why and what Beta-blocker was taken and is not prescribed / taking and why: .           Advanced Care Plan    ( x)  I confirmed that the patient's Advanced Care Plan is present, code status documented, or surrogate decision maker is listed in the patient's medical record.  ( )  The patient's advanced care plan is not present because:  (select)              ( ) I confirmed today that the patient does not wish or was not able to name a surrogate decision maker or provide an Advance Care Plan.              ( ) Hospice care is currently being provided or has been provided this calender year.  ( )  I did not confirm today the presence of an Advance Care Plan or surrogate decision maker documented within the patient's medical record. (Does not satisfy [facility] performance).                       Paul Jean MD, MD  Admitting Hospitalist

## 2025-07-11 NOTE — PROGRESS NOTES
Up to BR to shower.  Incisions cleansed with soap and water then dried.  Scabs remain on coccyx area-resinol applied after washed well.  Returns to sit in chair.  Verbalizes comfort and appreciation.

## 2025-07-11 NOTE — ACP (ADVANCE CARE PLANNING)
Advance Care Planning     Advance Care Planning Activator (Inpatient)  Conversation Note      Date of ACP Conversation: 7/11/2025     Conversation Conducted with: Patient with Decision Making Capacity    ACP Activator: Sarah Fowler, MSW, LSW        Health Care Decision Maker:     Current Designated Health Care Decision Maker:     Primary Decision Maker: Nicolle Plummer - [facility] - [phone]    Secondary Decision Maker: Drea Correa - [phone]    Supplemental (Other) Decision Maker: Daria Lynch - [phone]  Click here to complete Healthcare Decision Makers including section of the Healthcare Decision Maker Relationship (ie \"Primary\")  Today we documented Decision Maker(s) consistent with ACP documents on file.    Care Preferences    Ventilation:  \"If you were in your present state of health and suddenly became very ill and were unable to breathe on your own, what would your preference be about the use of a ventilator (breathing machine) if it were available to you?\"      Would the patient desire the use of ventilator (breathing machine)?: yes    \"If your health worsens and it becomes clear that your chance of recovery is unlikely, what would your preference be about the use of a ventilator (breathing machine) if it were available to you?\"     Would the patient desire the use of ventilator (breathing machine)?: Yes      Resuscitation  \"CPR works best to restart the heart when there is a sudden event, like a heart attack, in someone who is otherwise healthy. Unfortunately, CPR does not typically restart the heart for people who have serious health conditions or who are very sick.\"    \"In the event your heart stopped as a result of an underlying serious health condition, would you want attempts to be made to restart your heart (answer \"yes\" for attempt to resuscitate) or would you prefer a natural death (answer \"no\" for do not attempt to resuscitate)?\" yes       [] Yes   [x] No   Educated Patient / Decision

## 2025-07-11 NOTE — PROGRESS NOTES
RN case manager and SW met with pt to complete assessment. Pt is alert and oriented and very pleasant with assessment. Pt is a 70 year old  male admitted to swing bed from [facility] on 7/10/2025 with diagnosis of weakness after a very long stay in the hospital (6/9/2025 -7/10/2025) and a CABG x3 and afib with RVR and pleural effusion requiring a thoracentesis earlier this week. Pt lives with his spouse in their home in [place] [facility] [place] [facility] [place]. Pt has walker, cane, ramp, wheelchair, crutches,CPAP and home oxygen. Pt was not using any services at home prior to admission to hospital. Pt and spouse both drive and spouse will assist while pt is healing.     Pt is a full code and follows with Ann Arteaga CNP as PCP. Pt has advance directives on file and reports that these are correct. ACP note completed with pt. Pt reports that his medications are covered well by insurance. No concerns with food, housing or transportation expressed by pt.     SW provided swing bed packet and reviewed contents with pt. Discussed IDT meetings on Tuesdays as well. Pt expresses understanding and signs acknowledgement of receipt. This is placed in paper chart. Pt plans to return home after his time in swing bed to get stronger. Plan to evaluate pt progress on Tuesday 7/15/2025 at IDT meeting. REJI following. Sarah Fowler, MAG, ULISSESW 7/11/2025

## 2025-07-11 NOTE — H&P
History & Physical    Patient:  Mike Hensley  YOB: 1955  Date of Service: 7/10/2025  MRN: [id number]   Acct:   [id number]   Primary Care Physician: Cheyenne Arteaga APRN - CNP    Chief Complaint: Generalized weakness    History of Present Illness:     History obtained from chart review and the patient.    The patient is a 70 y.o. male with h/o severe CAD, CHF with reduced EF NYHA class III , moderate Aortic regurgitation, PAF, HTN, PVD with stenting  COPD admitted to swing bed for PT/OT due to  generalized weakness .   He was having worsening SOB and had an abnormal Lexiscan test on 4/14/25 consistent with inferior infarct and moderate grace-infarct perfusion defect with concern for ischemia. Subsequently he had an a cardiac cath at [facility]  on 5/8/25.  He was found to have severe RCA lesion and lesion in the LAD.     Patient was admitted to hospitalist services for further evaluation and management.     Patient was seen by cardiothoracic surgery  Patient also underwent JN on 5/9. EF was 35% and moderate aortic regurgitation.    Patient was recommended to undergo CABG. However he demanded to be discharged as AMA.   He was then referred to [facility] for CABG due to being high risk. Was scheduled for 6/25. Then he developed ICD discharge and went to [facility] for evaluation on 6/9. Was started on Heparin drip and eventually transferred to [facility].     Echo on  6/16/25: LVEF 40% with normal RV function, mod-severe AI.     On 6/20/25 underwent CABG X3  (LIMA to LAD, SVG as a sequential graft to PDA and OM2) and AVR   Postoperatively developed left pleural effusion requiring thoracentesis on 7/7  Had difficult to control Afib.  Received multiple doses of Digoxin and Amiodarone. S/P  DCCV to junctional rhythm on 6/25. Remains in A-fib with very difficult to control rate & marginal BP. Meds were adjusted.   Developed Right arm thrombophlebitis after Amio infiltration and completed 7 days of empiric

## 2025-07-11 NOTE — THERAPY EVALUATION
[facility]  Physical Therapy  Evaluation  Date: 2025  Patient Name: Mike Hensley        MRN: [id number]    : 1955  (70 y.o.)  Gender: male   Referring Practitioner: Dr. Jean  Diagnosis: Weakness  Additional Pertinent Hx: Admitted to [facility] for continued strengthening following an extensive CABG and cardiac issues.  Referred to PT to assess strength an mobility   Past Medical History:   Diagnosis Date    Atrial fibrillation (HCC)     CAD (coronary artery disease)     CHF (congestive heart failure) (HCC)     Chronic kidney disease     COPD (chronic obstructive pulmonary disease) (HCC)     ETOH abuse     Hyperlipidemia     Hypertension     Lung nodules     STARR (obstructive sleep apnea)     Peripheral vascular disease     Squamous cell carcinoma of rectum (HCC)     With lymph node resection, 33 Rx of Radiation + Chemo     Past Surgical History:   Procedure Laterality Date    CABG WITH AORTIC VALVE REPLACEMENT  06/20/2025    x3    CARDIAC DEFIBRILLATOR PLACEMENT      COLONOSCOPY  2025    Polyps found- x1 in cecum, x2 in ascending colon and x6 in descending colon. No clipping or polypectomy. Rec. repeat in 1 year for polypectomy.    FEMUR FRACTURE SURGERY      age 20    LEG SURGERY      stents in both legs    PACEMAKER INSERTION         Restrictions         Subjective         Pain Level: 0    Orientation  Overall Orientation Status: Within Normal Limits    Home Living / Prior Level of Function  Social/Functional History  Lives With: Significant other  Type of Home: House  Home Layout: Two level, Able to Live on Main level with bedroom/bathroom  Home Access: Ramped entrance (Patient reports that ramp is new, hasn't used it yet)  Bathroom Shower/Tub: Tub/Shower unit  Bathroom Toilet: Handicap height  Bathroom Equipment: Shower chair  Bathroom Accessibility: Accessible  Home Equipment: Walker - Rolling, Wheelchair - Manual, Cane, Crutches  Has the patient had two or more falls in the past

## 2025-07-11 NOTE — PROGRESS NOTES
[facility]  Swing Bed Evaluation for Certification for Skilled Care    Mike Hensley meets skilled criteria due to the need for skilled nursing supervision or skilled rehabilitation services listed below:   [x] Therapy; physical and occupational; for decreased strength, balance and self         care activities.   [] Tpn    [] Trach care   [] IV therapy   [x] Wound care    [] Other Skilled Need:        Mike Hensley lives [] Alone      [x] With Spouse    [] Other:   and plans on returning there at discharge.     [] Pt declines list of alternate providers, pt prefers to receive skilled care at [facility]  [] List of alternate providers given to patient, pt prefers to receive skilled care at [facility]  [x] Not applicable, pt admitted to [facility] from Outside Facility    Mike Hensley prefers this facility for skilled care and services can only be practically provided in a skilled nursing facility or swing bed hospital on an inpatient basis. Mike Hensley will require skilled care on a daily basis beginning 7/10/2025, if medically stable.  These services are for an ongoing condition for which Mike Hensley received inpatient care for at the hospital.        MAG Sampson, LSW Date: 7/11/2025

## 2025-07-11 NOTE — PROGRESS NOTES
[facility]  Phone: [phone]    Occupational Therapy Evaluation    Date: 2025  Patient Name: Mike Hensley        MRN: [id number]    : 1955  (70 y.o.)  Gender: male         Past Surgical History:   Procedure Laterality Date    CABG WITH AORTIC VALVE REPLACEMENT  06/20/2025    x3    CARDIAC DEFIBRILLATOR PLACEMENT      COLONOSCOPY  2025    Polyps found- x1 in cecum, x2 in ascending colon and x6 in descending colon. No clipping or polypectomy. Rec. repeat in 1 year for polypectomy.    FEMUR FRACTURE SURGERY      age 20    LEG SURGERY      stents in both legs    PACEMAKER INSERTION       Subjective:     Subjective: Patient upright in chair on therapist arrival, agreeabel to OT evaluation. States that he slept really well last night.    Objective:     Social/Functional History:  Lives With: Significant other  Type of Home: House  Home Layout: Two level, Able to Live on Main level with bedroom/bathroom  Home Access: Ramped entrance (Patient reports that ramp is new, hasn't used it yet)  Bathroom Shower/Tub: Tub/Shower unit  Bathroom Toilet: Handicap height  Bathroom Equipment: Shower chair  Bathroom Accessibility: Accessible  Home Equipment: Walker - Rolling, Wheelchair - Manual, Cane (Patient reports that he has the listed devices, but he hasn't had to use any in over a year.)    PLOF:  Receives Help From: Friend(s)  Prior Level of Assist for ADLs: Independent  Prior Level of Assist for Homemaking: Independent     Prior Level of Assist for Transfers: Independent    ADLs:  Feeding: Modified independent   Grooming: Supervision  UE Bathing: Supervision  LE Bathing: Minimal assistance  UE Dressing: Modified independent   LE Dressing: Minimal assistance  Toileting: Contact guard assistance    Transfers:        Sit to stand: Contact guard assistance   Stand to sit: Contact guard assistance  Toilet Transfer: Contact guard assistance     Assessment:     Performance deficits / Impairments:  Decreased functional mobility , Decreased ADL status, Decreased strength, Decreased balance, Decreased endurance, Decreased coordination    Assessment: Patient is a 70 year old male, s/p CABGx3 on 6/20/2025. Increased fatigue and limited endurance impacts his ability to complete ADL and functional tasks with independence and proper safety. Increased time needed to complete tasks. Patient would benefit from skilled occupational therapy services to improve strength, endurance, and independence with ADL tasks. Patient sitting in chair on therapist arrival, agreeable to OT evaluation. on 2L continuous oxygen at this time via NC. Reports he has had bi-pap for sleeping for 4 years, 2 L at night.    Goals:     Short term goals:  Time Frame for Short Term Goals: 10 days (7/21/2025)  Short Term Goal 1: Patient will complete UB/LB dressing tasks Luz Elena to increase safety and independence with tasks.  Short Term Goal 2: Patient will tolerate standing while enaging in functional task/ADL for >5 minutes without rest break or loss of balance to improve safety and endurance for ADL tasks.  Short Term Goal 3: Patient will complete toileting routine Luz Elena to increase safety and independence for return home.  Short Term Goal 4: Patient will complete bathing, oral care, and grooming tasks with SUP for improved independence.  Short Term Goal 5: Patient will participate in BUE ROM/strengthening tasks as appropriate to improve independence wtih functional and ADL tasks.    Plan:   Times Per Day: 1-2x/day  Discharge Plan: Continue to assess pending patient progress         Time In: 0927  Time Out: 0955  Timed Coded Minutes: 0  Total Treatment Time: 28    CLIVE Black/ALFONSO      Date: 7/11/2025

## 2025-07-11 NOTE — RT PROTOCOL NOTE
RESPIRATORY ASSESSMENT PROTOCOL                                                                                              Patient Name: Mike Hensley Room#: 0271/0271-01 : 1955     Admitting diagnosis: Weakness [R53.1]       Medical History:   Past Medical History:   Diagnosis Date    Atrial fibrillation (HCC)     CAD (coronary artery disease)     CHF (congestive heart failure) (HCC)     Chronic kidney disease     COPD (chronic obstructive pulmonary disease) (HCC)     ETOH abuse     Hyperlipidemia     Hypertension     Lung nodules     STARR (obstructive sleep apnea)     Peripheral vascular disease     Squamous cell carcinoma of rectum (HCC)     With lymph node resection, 33 Rx of Radiation + Chemo       PATIENT ASSESSMENT    LABORATORY DATA  Hematology:   Lab Results   Component Value Date/Time    WBC 7.7 2022 04:55 PM    RBC 3.75 2022 04:55 PM    HGB 12.3 2022 04:55 PM    HCT 36.1 2022 04:55 PM     2022 04:55 PM     Chemistry:  No results found for: \"PHART\", \"CUB9PCL\", \"PO2ART\", \"X6EYZGMI\", \"AIV6NNV\", \"PBEA\", \"NBEA\"    VITALS  Pulse: 66   Respirations: 22  BP: (!) 126/92  SpO2: 100 % O2 Device: Nasal cannula  Temp: 98.1 °F (36.7 °C)    SKIN COLOR  [x] Normal  [] Pale  [] Dusky  [] Cyanotic    RESPIRATORY PATTERN  [x] Normal  [] Dyspnea  [] Cheyne-Finley  [] Kussmaul  [] Biots    AMBULATORY  [x] Yes  [] No  [x] With Assistance    PEAK FLOW  Predicted:     Personal Best:            Patient Acuity 0 1 2 3 4 Score   Level of Consciousness (LOC) [x]  Alert & Oriented or Pt normal LOC []  Confused;follows directions []  Confused & uncooper-ative []  Obtunded []  Comatose 0   Respiratory Rate  (RR) [x]  Reg. rate & pattern. 12 - 20 bpm  []  Increased RR. Greater than 20 bpm   []  SOB w/ exertion or RR greater than 24 bpm []  Access- ory muscle use at rest. Abn.  resp. []  SOB at rest.   0   Bilateral Breath Sounds (BBS) []  Clear []  Diminish-ed bases  []  Diminish-ed t/o, or

## 2025-07-11 NOTE — PROGRESS NOTES
Comprehensive Nutrition Assessment    Type and Reason for Visit:  Initial    Nutrition Recommendations/Plan:   Recommend liberate diet to regular textures and allow patient to choose to his ability to chew.      Malnutrition Assessment:  Malnutrition Status:  No malnutrition (07/11/25 0933)    Context:  Acute Illness     Findings of the 6 clinical characteristics of malnutrition:  Energy Intake:  No decrease in energy intake  Weight Loss:  No weight loss     Body Fat Loss:  No body fat loss     Muscle Mass Loss:  No muscle mass loss    Fluid Accumulation:  Moderate to Severe Extremities   Strength:  Not Performed    Nutrition Assessment:    Increased nutrient needs r/t acute injury or trauma, AEB post op CABG. Also had multiple tooth extractions for which softer foods better tolerated. Generally stable weight reported. No ingestion concerns aside from softer food needs. He discusses and describes foods that he can masticate that would not be allowed on current diet and for that reasone I would recommend advancing diet to regular textures and allow patient to choose per his perceived abilities to chew r/t teeth extractions. Is on a f/u and since PO is good, will hold off on ONS as that will use up some of his f/r.    Nutrition Related Findings:    active b/s. +3 BLE edema. Wound Type: Surgical Incision       Current Nutrition Intake & Therapies:    Average Meal Intake: %  Average Supplements Intake: None Ordered (was taking some at [facility])  ADULT DIET; Easy to Chew; Low Sodium (2 gm); 2000 ml    Anthropometric Measures:  Height: 182.9 cm (6')  Ideal Body Weight (IBW): 178 lbs (81 kg)    Admission Body Weight: 119.9 kg (264 lb 5.3 oz)  Current Body Weight: 119.9 kg (264 lb 5.3 oz), 148.5 % IBW. Weight Source: Bed scale  Current BMI (kg/m2): 35.8  Usual Body Weight: 122.5 kg (270 lb) (in 2022)     % Weight Change (Calculated): -2.1  Weight Adjustment For: No Adjustment                 BMI Categories: Obese Class

## 2025-07-11 NOTE — PROGRESS NOTES
Phone: [phone] [facility]  Date: 2025  Fax: [phone]      Physical Therapy    Daily Note    Patient Name: Mike Hensley      : 1955  (70 y.o.)  MRN: [id number]      Assessment                  Sit to Stand: Stand by assistance  Stand to Sit: Stand by assistance                WB Status: gait with wheeled walker and SBA~15 ft x1; 10 ft x 2 (2L of O2)                Assessment: Patient seated up in chair with friend present. Sit to stand from chair is SBA. Ambulates with wheeled walker to w/c in hallway ~15 ft x1. Wheeled to therapy room and completes seated B LE exercises as outlined above along with Nustep machine. Wheeled back to room and requests need for bathroom.  Ambulates into bathroom and requires SBA for stand to sit to stand from commode.  Up in chair following with call light in reach and nurse and friend in room with patient.  Safety Devices  Type of Devices: Call light within reach, Gait belt, Left in chair, Nurse notified          Call light in reach, Use of Gait belt, Nurse Notified, Other Staff Present  , Family Present, and Left in chair  Time In: 1448  Time Out: 1534  Timed Coded Minutes: 36  Total Treatment Time: 46       Exercises:  See Flowsheets    Plan  Cont Per Plan Of Care    Goals  Short Term Goals     Short Term Goal 1: STG=LTG                Long Term Goals  Time Frame for Long Term Goals : 7 days - expires 25  Long Term Goal 1: Complete basic transfers independently to return home safely  Long Term Goal 2: Ambulate with wh walker or least assistive device x 100ft with supervision to safely negotiate home environment  Long Term Goal 3: Good endurance to complete above ambulation and self-care tasks to reduce falls risk associated with fatigue  Long Term Goal 4: Good dynamic standing balance to complete self-care tasks       [facility]agu[facility] License Number: [id number]    Date: 2025

## 2025-07-11 NOTE — CARE COORDINATION
Case Management Assessment  Initial Evaluation    Date/Time of Evaluation: 7/11/2025 11:49 AM  Assessment Completed by: Nick Clements RN    If patient is discharged prior to next notation, then this note serves as note for discharge by case management.    Patient Name: Mike Hensley                   YOB: 1955  Diagnosis: Weakness [R53.1]                   Date / Time: 7/10/2025  6:24 PM    Patient Admission Status: SKILLED IP   Readmission Risk (Low < 19, Mod (19-27), High > 27): Readmission Risk Score: 6.3    Current PCP: Cheyenne Arteaga APRN - CNP  PCP verified by CM? (P) Yes (Cheyenne Leyva with Dr. Allred)    Chart Reviewed: Yes      History Provided by: (P) Patient  Patient Orientation: (P) Alert and Oriented, Person, Place, Situation, Self    Patient Cognition: (P) Alert    Hospitalization in the last 30 days (Readmission):  No    If yes, Readmission Assessment in CM Navigator will be completed.    Advance Directives:      Code Status: Full Code   Patient's Primary Decision Maker is: (P) Legal Next of Kin    Primary Decision Maker: Nicolle Plummer - Spouse - [phone]    Secondary Decision Maker: Drea Correa - [phone]    Supplemental (Other) Decision Maker: Daria Lynch - [phone]    Discharge Planning:    Patient lives with: (P) Spouse/Significant Other Type of Home: (P) House  Primary Care Giver: (P) Self  Patient Support Systems include: (P) Spouse/Significant Other, Children, Family Members   Current Financial resources: (P) Medicare  Current community resources: (P) None  Current services prior to admission: (P) Home Bipap, Durable Medical Equipment            Current DME: (P) Walker, Cane, Crutches, Wheelchair            Type of Home Care services:  (P) Housekeeping    ADLS  Prior functional level: (P) Independent in ADLs/IADLs  Current functional level: (P) Assistance with the following:, Mobility, Shopping, Housework, Cooking, Toileting, Dressing, Bathing    PT AM-PAC:

## 2025-07-11 NOTE — PLAN OF CARE
[facility]  Plan of Care  Date: 2025  Patient Name: Mike Hensley        : 1955  (70 y.o.)  Referring Practitioner: Dr. Jean  Admission Date: 7/10/2025  Referral Date : 07/10/25  Diagnosis: Weakness  Treatment Diagnosis: Weakness  PT Orders Received and Evaluation Complete  Identified Problem Areas:  Assessment: Patient admitted to [facility] for continued strengthening and functional mobilitiy following an extensive CABG/cardiac issues.  Patient overall moves well however become fatigued with short distance ambulation and would benefit from PT to progress with strengthening and functional mobility /endurance training  Therapy Prognosis: Good    Justification for Skilled Services:  [] Reduce Falls   [x] Improve Ambulation  [x]  Complete Daily Tasks Safely   [] Improve Balance   [x] Improve LE strength  []  Return to Prior Level of Function  [x] Improve Functional Mobility   []  Family/Caregiver Education  [x] Patient Education: []Assistive Devices []Home Exercise Program and Progression    Plan  Frequency: 1-2x/day Daily M-F, 1x/day Sat-Sun    Duration: 7 days  [] Modalities:  [x] Therapeutic Exercise   [x] Gait Training  [x] Therapeutic Activity    [] Home Safety Evaluation         [] Massage                        [] Neuromuscular Re-education [] Back Education             [x] Patient Education [] Home Exercise Program  Discharge Recommendations: Home with assist PRN (OP Cardiac Rehab)    Rehab Potential:  [x]  Good [] Fair   []  Poor    Goals  Short Term Goals  Short Term Goal 1: STG=LTG    Long Term Goals  Time Frame for Long Term Goals : 7 days - expires 25  Long Term Goal 1: Complete basic transfers independently to return home safely  Long Term Goal 2: Ambulate with wh walker or least assistive device x 100ft with supervision to safely negotiate home environment  Long Term Goal 3: Good endurance to complete above ambulation and self-care tasks to

## 2025-07-11 NOTE — PROGRESS NOTES
SWINGBED PATIENT ACTIVITY PROGRAM ASSESSMENT    Patient Name: Mike Hensley  : 1955   Gender: male   Diagnosis:  Weakness [R53.1] MRN: [id number]     Ability to read or write? [x] Yes   [] No  Ability to hear?   [x] Yes   [] No  Is patient confused?  [] Yes   [x] No    Enter Codes in Boxes Coding:  Very Important  Somewhat important  Not very important  Not important at all  Important but can’t do or no choice  No response or non-responsive   1 A. How important is it to you to have books, newspapers, and magazines to read?   2 B.  How important is it to you to listen to music you like?   3 C.  How important is it to you to be around animals such as pets?   1 D.  How important is it to you to keep up with the news?   2 E.  How important is it to you to do things with groups of people?   1 F.  How important is it to you to do your favorite activities?   1 G.  How important is it to you to go outside to get fresh air when the weather is good?   1 H.  How important is it to you to participate in [employer] services or practices?     Activity Care Plan:  Will participate in activity programs of choice daily with no decline throughout [facility] stay.          EVALUATOR’S SIGNATURE:  Rachel Poole  Date: 2025

## 2025-07-11 NOTE — PROGRESS NOTES
Pt states that [facility] is unable to get the Peridex mouthrinse until Monday.  Dr. Jean made aware.

## 2025-07-11 NOTE — PROGRESS NOTES
[facility]  Occupational Therapy    Date: 2025  Patient Name: Mike Hensley        : 1955       [] Pt Refusal           [x] Pt Unavailable due to:  Attempt was made at 1508 to see patient for PM OT session, currently working with PTA. Will continue to address goals and progress patient as able.        LINDA Boss/ALFONSO  Date: 2025

## 2025-07-12 LAB
ANION GAP SERPL CALCULATED.3IONS-SCNC: 11 MMOL/L (ref 9–17)
BASOPHILS # BLD: 0.01 K/UL (ref 0–0.2)
BASOPHILS NFR BLD: 0 % (ref 0–2)
BUN SERPL-MCNC: 27 MG/DL (ref 8–23)
CALCIUM SERPL-MCNC: 8.7 MG/DL (ref 8.6–10.4)
CHLORIDE SERPL-SCNC: 100 MMOL/L (ref 98–107)
CO2 SERPL-SCNC: 27 MMOL/L (ref 20–31)
CREAT SERPL-MCNC: 1.3 MG/DL (ref 0.7–1.2)
EOSINOPHIL # BLD: 0.15 K/UL (ref 0–0.4)
EOSINOPHILS RELATIVE PERCENT: 2 % (ref 0–5)
ERYTHROCYTE [DISTWIDTH] IN BLOOD BY AUTOMATED COUNT: 16.1 % (ref 12.1–15.2)
GFR, ESTIMATED: 59 ML/MIN/1.73M2
GLUCOSE SERPL-MCNC: 96 MG/DL (ref 70–99)
HCT VFR BLD AUTO: 35.2 % (ref 41–53)
HGB BLD-MCNC: 10.9 G/DL (ref 13.5–17.5)
IMM GRANULOCYTES # BLD AUTO: 0.02 K/UL (ref 0–0.3)
IMM GRANULOCYTES NFR BLD: 0 % (ref 0–5)
LYMPHOCYTES NFR BLD: 1.21 K/UL (ref 1–4.8)
LYMPHOCYTES RELATIVE PERCENT: 15 % (ref 13–44)
MCH RBC QN AUTO: 29.1 PG (ref 26–34)
MCHC RBC AUTO-ENTMCNC: 31 G/DL (ref 31–37)
MCV RBC AUTO: 93.9 FL (ref 80–100)
MONOCYTES NFR BLD: 0.72 K/UL (ref 0–1)
MONOCYTES NFR BLD: 9 % (ref 5–9)
NEUTROPHILS NFR BLD: 74 % (ref 39–75)
NEUTS SEG NFR BLD: 6.19 K/UL (ref 2.1–6.5)
PLATELET # BLD AUTO: 206 K/UL (ref 140–450)
PMV BLD AUTO: 8.6 FL (ref 6–12)
POTASSIUM SERPL-SCNC: 3.7 MMOL/L (ref 3.7–5.3)
RBC # BLD AUTO: 3.75 M/UL (ref 4.5–5.9)
SODIUM SERPL-SCNC: 138 MMOL/L (ref 135–144)
WBC OTHER # BLD: 8.3 K/UL (ref 3.5–11)

## 2025-07-12 PROCEDURE — 97110 THERAPEUTIC EXERCISES: CPT

## 2025-07-12 PROCEDURE — 94761 N-INVAS EAR/PLS OXIMETRY MLT: CPT

## 2025-07-12 PROCEDURE — 36415 COLL VENOUS BLD VENIPUNCTURE: CPT

## 2025-07-12 PROCEDURE — 94669 MECHANICAL CHEST WALL OSCILL: CPT

## 2025-07-12 PROCEDURE — 1200000002 HC SEMI PRIVATE SWING BED

## 2025-07-12 PROCEDURE — 6370000000 HC RX 637 (ALT 250 FOR IP): Performed by: INTERNAL MEDICINE

## 2025-07-12 PROCEDURE — 6360000002 HC RX W HCPCS: Performed by: INTERNAL MEDICINE

## 2025-07-12 PROCEDURE — 2700000000 HC OXYGEN THERAPY PER DAY

## 2025-07-12 PROCEDURE — 85025 COMPLETE CBC W/AUTO DIFF WBC: CPT

## 2025-07-12 PROCEDURE — 94640 AIRWAY INHALATION TREATMENT: CPT

## 2025-07-12 PROCEDURE — 80048 BASIC METABOLIC PNL TOTAL CA: CPT

## 2025-07-12 PROCEDURE — 97116 GAIT TRAINING THERAPY: CPT

## 2025-07-12 RX ADMIN — APIXABAN 5 MG: 5 TABLET, FILM COATED ORAL at 09:13

## 2025-07-12 RX ADMIN — DIGOXIN 125 MCG: 125 TABLET ORAL at 09:21

## 2025-07-12 RX ADMIN — OXYCODONE 5 MG: 5 TABLET ORAL at 04:24

## 2025-07-12 RX ADMIN — TORSEMIDE 40 MG: 10 TABLET ORAL at 09:13

## 2025-07-12 RX ADMIN — METOPROLOL SUCCINATE 50 MG: 50 TABLET, EXTENDED RELEASE ORAL at 09:14

## 2025-07-12 RX ADMIN — GABAPENTIN 100 MG: 100 CAPSULE ORAL at 20:56

## 2025-07-12 RX ADMIN — ALLOPURINOL 100 MG: 100 TABLET ORAL at 09:14

## 2025-07-12 RX ADMIN — MUPIROCIN: 20 OINTMENT TOPICAL at 09:18

## 2025-07-12 RX ADMIN — APIXABAN 5 MG: 5 TABLET, FILM COATED ORAL at 20:56

## 2025-07-12 RX ADMIN — TRAZODONE HYDROCHLORIDE 50 MG: 50 TABLET ORAL at 20:55

## 2025-07-12 RX ADMIN — PSYLLIUM HUSK 1 PACKET: 3.4 POWDER ORAL at 09:14

## 2025-07-12 RX ADMIN — AMIODARONE HYDROCHLORIDE 200 MG: 200 TABLET ORAL at 09:14

## 2025-07-12 RX ADMIN — IPRATROPIUM BROMIDE AND ALBUTEROL SULFATE 1 DOSE: .5; 3 SOLUTION RESPIRATORY (INHALATION) at 21:09

## 2025-07-12 RX ADMIN — EMPAGLIFLOZIN 10 MG: 10 TABLET, FILM COATED ORAL at 09:14

## 2025-07-12 RX ADMIN — MUPIROCIN: 20 OINTMENT TOPICAL at 21:01

## 2025-07-12 RX ADMIN — IPRATROPIUM BROMIDE AND ALBUTEROL SULFATE 1 DOSE: .5; 3 SOLUTION RESPIRATORY (INHALATION) at 14:14

## 2025-07-12 RX ADMIN — SACUBITRIL AND VALSARTAN 0.5 TABLET: 24; 26 TABLET, FILM COATED ORAL at 20:55

## 2025-07-12 RX ADMIN — BUDESONIDE 250 MCG: 0.25 SUSPENSION RESPIRATORY (INHALATION) at 08:59

## 2025-07-12 RX ADMIN — ATORVASTATIN CALCIUM 40 MG: 40 TABLET, FILM COATED ORAL at 20:55

## 2025-07-12 RX ADMIN — GABAPENTIN 100 MG: 100 CAPSULE ORAL at 09:14

## 2025-07-12 RX ADMIN — METOPROLOL SUCCINATE 50 MG: 50 TABLET, EXTENDED RELEASE ORAL at 20:55

## 2025-07-12 RX ADMIN — IPRATROPIUM BROMIDE AND ALBUTEROL SULFATE 1 DOSE: .5; 3 SOLUTION RESPIRATORY (INHALATION) at 08:59

## 2025-07-12 RX ADMIN — ASPIRIN 81 MG: 81 TABLET, CHEWABLE ORAL at 09:13

## 2025-07-12 RX ADMIN — OXYCODONE 5 MG: 5 TABLET ORAL at 21:00

## 2025-07-12 RX ADMIN — Medication 400 MG: at 20:55

## 2025-07-12 RX ADMIN — BUDESONIDE 250 MCG: 0.25 SUSPENSION RESPIRATORY (INHALATION) at 21:09

## 2025-07-12 RX ADMIN — SACUBITRIL AND VALSARTAN 0.5 TABLET: 24; 26 TABLET, FILM COATED ORAL at 09:14

## 2025-07-12 RX ADMIN — PANTOPRAZOLE SODIUM 40 MG: 40 TABLET, DELAYED RELEASE ORAL at 06:48

## 2025-07-12 ASSESSMENT — PAIN DESCRIPTION - ORIENTATION
ORIENTATION: LEFT;POSTERIOR
ORIENTATION: LEFT;POSTERIOR
ORIENTATION: MID;LEFT

## 2025-07-12 ASSESSMENT — PAIN SCALES - GENERAL
PAINLEVEL_OUTOF10: 6
PAINLEVEL_OUTOF10: 3
PAINLEVEL_OUTOF10: 2
PAINLEVEL_OUTOF10: 3
PAINLEVEL_OUTOF10: 0
PAINLEVEL_OUTOF10: 3
PAINLEVEL_OUTOF10: 0

## 2025-07-12 ASSESSMENT — PAIN DESCRIPTION - DESCRIPTORS
DESCRIPTORS: STABBING
DESCRIPTORS: STABBING

## 2025-07-12 ASSESSMENT — PAIN - FUNCTIONAL ASSESSMENT: PAIN_FUNCTIONAL_ASSESSMENT: ACTIVITIES ARE NOT PREVENTED

## 2025-07-12 ASSESSMENT — PAIN DESCRIPTION - LOCATION
LOCATION: BACK
LOCATION: RIB CAGE
LOCATION: SHOULDER
LOCATION: SHOULDER;RIB CAGE

## 2025-07-12 NOTE — RT PROTOCOL NOTE
RESPIRATORY ASSESSMENT PROTOCOL                                                                                              Patient Name: Mike Hensley Room#: 0271/0271-01 : 1955     Admitting diagnosis: Weakness [R53.1]       Medical History:   Past Medical History:   Diagnosis Date    Atrial fibrillation (HCC)     CAD (coronary artery disease)     CHF (congestive heart failure) (HCC)     Chronic kidney disease     COPD (chronic obstructive pulmonary disease) (HCC)     ETOH abuse     Hyperlipidemia     Hypertension     Lung nodules     STARR (obstructive sleep apnea)     Peripheral vascular disease     Squamous cell carcinoma of rectum (HCC)     With lymph node resection, 33 Rx of Radiation + Chemo       PATIENT ASSESSMENT    LABORATORY DATA  Hematology:   Lab Results   Component Value Date/Time    WBC 8.3 2025 04:20 AM    RBC 3.75 2025 04:20 AM    HGB 10.9 2025 04:20 AM    HCT 35.2 2025 04:20 AM     2025 04:20 AM     Chemistry:  No results found for: \"PHART\", \"IYP8BIC\", \"PO2ART\", \"T9LAVKGO\", \"XUR3KZH\", \"PBEA\", \"NBEA\"    VITALS  Pulse: 63   Respirations: 18  BP: 104/70  SpO2: 94 % O2 Device: None (Room air)  Temp: 97.9 °F (36.6 °C)    SKIN COLOR  [x] Normal  [] Pale  [] Dusky  [] Cyanotic    RESPIRATORY PATTERN  [x] Normal  [] Dyspnea  [] Cheyne-Finley  [] Kussmaul  [] Biots    AMBULATORY  [x] Yes  [] No  [x] With Assistance    PEAK FLOW  Predicted:     Personal Best:            Patient Acuity 0 1 2 3 4 Score   Level of Consciousness (LOC) [x]  Alert & Oriented or Pt normal LOC []  Confused;follows directions []  Confused & uncooper-ative []  Obtunded []  Comatose 0   Respiratory Rate  (RR) [x]  Reg. rate & pattern. 12 - 20 bpm  []  Increased RR. Greater than 20 bpm   []  SOB w/ exertion or RR greater than 24 bpm []  Access- ory muscle use at rest. Abn.  resp. []  SOB at rest.   0   Bilateral Breath Sounds (BBS) []  Clear []  Diminish-ed bases  []  Diminish-ed t/o, or  rales   [x]  Sporadic, scattered wheezes or rhonchi []  Persistentwheezes and, or absent BBS 3   Cough [x]  Strong, effective, & non-prod. []  Effective & prod. Less than 25 ml (2 TBSP) over past 24 hrs []  Ineffective & non-prod to less than 25 ML over past 24 hrs []  Ineffective and, or greater than 25 ml sputum prod. past 24 hrs. []  Nonspon- taneous; Requires suctioning 0   Pulmonary History  (PULM HX) []  No smoking and no chronic pulmonary history [x]  Former smoker. Quit over 12 mos. ago []  Current smoker or quit w/ in 12 mos []  Pulm. History and, or 20 pk/yr smoking hx []  Admitted w/ acute pulm. dx and, or has been admitted w/ pulm. dx 2 or more times over past 12 mos 1   Surgical History this Admit  (SURG HX) []  No surgery [x]  General surgery []  Lower abdominal []  Thoracic or upper abdominal   []  Thoracic w/ pulm. disease 1   Chest X-Ray (CXR)/CT Scan []  Clear or not applicable []  Not available []  Atelectasis or pleural effusions [x]  Localized infiltrate or pulm. edema []  Con-solidated Infiltrates, bilateral, or in more than 1 lobe 3   TOTAL ACUITY: 8       CARE PLAN    If Acuity Level is 2, 3, or 4 in any of the following:    [x] BILATERAL BREATH SOUNDS (BBS)     [x] PULMONARY HISTORY (PULM HX)  [] Respiratory Rate  (RR)    Goal: Improve respiratory functions in patients with airway disease and decrease WOB    [] AEROSOL PROTOCOL    Total Acuity:   14-28  []  Secondary Assessment in 24 hrs Total Acuity:  9-13  []  Secondary Assessment in 24 hrs Total Acuity:  4-8  [x]  Secondary Assessment in 48 hrs Total Acuity:  0-3  []  Secondary Assessment in 48 hrs   HHN AEROSOL THERAPY with  [physician-ordered bronchodilator(s)] q 4 & Albuterol PRN q2 hrs.   Breath-Actuated Neb if BBS Acuity = 4, and pt. can use MP. Notify physician if condition deteriorates.  HHN AEROSOL THERAPY with  [physician-ordered bronchodilator(s)]  QID and Albuterol PRN q4 hrs.   Breath-Actuated Neb if BBS Acuity = 4, and pt.

## 2025-07-12 NOTE — PROGRESS NOTES
Phone: [phone] [facility]  Date: 2025  Fax: [phone]      Physical Therapy    Daily Note    Patient Name: Mike Hensley      : 1955  (70 y.o.)  MRN: [id number]       Assessment: Pt up in chair upon arrival. Transfers out of chair with SBA. Amb into hallway with WWalker x 20 feet limited by c/o feeling dizzy. RN advised ok to proceed. Pt now on room air. Wheeled to therapy  where he performed therex as outlined .Kirstin Nustep LE only x 5 min. LE PREs as outlined. Pt walker 20 ft back to his chair. He requests O2, RN notified and caring for pt after session.          Call light in reach, Phone in reach, Use of Gait belt, Nurse Notified, and Left in chair  Time In: 915  Time Out: 955  Timed Coded Minutes: 40  Total Treatment Time: 40       Exercises:  See Flowsheets    Plan  Cont Per Plan Of Care      Long Term Goals  Time Frame for Long Term Goals : 7 days - expires 25  Long Term Goal 1: Complete basic transfers independently to return home safely  Long Term Goal 2: Ambulate with wh walker or least assistive device x 100ft with supervision to safely negotiate home environment  Long Term Goal 3: Good endurance to complete above ambulation and self-care tasks to reduce falls risk associated with fatigue  Long Term Goal 4: Good dynamic standing balance to complete self-care tasks       Ariana Chio Therapy License Number: [id number]    Date: 2025

## 2025-07-13 PROCEDURE — 94761 N-INVAS EAR/PLS OXIMETRY MLT: CPT

## 2025-07-13 PROCEDURE — 94669 MECHANICAL CHEST WALL OSCILL: CPT

## 2025-07-13 PROCEDURE — 97110 THERAPEUTIC EXERCISES: CPT

## 2025-07-13 PROCEDURE — 1200000002 HC SEMI PRIVATE SWING BED

## 2025-07-13 PROCEDURE — 97116 GAIT TRAINING THERAPY: CPT

## 2025-07-13 PROCEDURE — 94640 AIRWAY INHALATION TREATMENT: CPT

## 2025-07-13 PROCEDURE — 6370000000 HC RX 637 (ALT 250 FOR IP): Performed by: INTERNAL MEDICINE

## 2025-07-13 PROCEDURE — 6360000002 HC RX W HCPCS: Performed by: INTERNAL MEDICINE

## 2025-07-13 PROCEDURE — 2700000000 HC OXYGEN THERAPY PER DAY

## 2025-07-13 RX ORDER — METOPROLOL SUCCINATE 25 MG/1
25 TABLET, EXTENDED RELEASE ORAL 2 TIMES DAILY
Status: DISCONTINUED | OUTPATIENT
Start: 2025-07-13 | End: 2025-07-18 | Stop reason: HOSPADM

## 2025-07-13 RX ADMIN — APIXABAN 5 MG: 5 TABLET, FILM COATED ORAL at 08:07

## 2025-07-13 RX ADMIN — MUPIROCIN: 20 OINTMENT TOPICAL at 08:10

## 2025-07-13 RX ADMIN — DIGOXIN 125 MCG: 125 TABLET ORAL at 08:07

## 2025-07-13 RX ADMIN — EMPAGLIFLOZIN 10 MG: 10 TABLET, FILM COATED ORAL at 08:06

## 2025-07-13 RX ADMIN — Medication 400 MG: at 21:16

## 2025-07-13 RX ADMIN — OXYCODONE 5 MG: 5 TABLET ORAL at 21:15

## 2025-07-13 RX ADMIN — BUDESONIDE 250 MCG: 0.25 SUSPENSION RESPIRATORY (INHALATION) at 20:43

## 2025-07-13 RX ADMIN — METOPROLOL SUCCINATE 25 MG: 25 TABLET, EXTENDED RELEASE ORAL at 08:06

## 2025-07-13 RX ADMIN — CHLORHEXIDINE GLUCONATE 15 ML: 1.2 RINSE ORAL at 21:20

## 2025-07-13 RX ADMIN — IPRATROPIUM BROMIDE AND ALBUTEROL SULFATE 1 DOSE: .5; 3 SOLUTION RESPIRATORY (INHALATION) at 08:27

## 2025-07-13 RX ADMIN — ALLOPURINOL 100 MG: 100 TABLET ORAL at 08:07

## 2025-07-13 RX ADMIN — TRAZODONE HYDROCHLORIDE 50 MG: 50 TABLET ORAL at 21:15

## 2025-07-13 RX ADMIN — ASPIRIN 81 MG: 81 TABLET, CHEWABLE ORAL at 08:07

## 2025-07-13 RX ADMIN — CHLORHEXIDINE GLUCONATE 15 ML: 1.2 RINSE ORAL at 10:26

## 2025-07-13 RX ADMIN — GABAPENTIN 100 MG: 100 CAPSULE ORAL at 21:16

## 2025-07-13 RX ADMIN — AMIODARONE HYDROCHLORIDE 200 MG: 200 TABLET ORAL at 08:07

## 2025-07-13 RX ADMIN — GABAPENTIN 100 MG: 100 CAPSULE ORAL at 08:06

## 2025-07-13 RX ADMIN — PANTOPRAZOLE SODIUM 40 MG: 40 TABLET, DELAYED RELEASE ORAL at 05:59

## 2025-07-13 RX ADMIN — BUDESONIDE 250 MCG: 0.25 SUSPENSION RESPIRATORY (INHALATION) at 08:27

## 2025-07-13 RX ADMIN — OXYCODONE 5 MG: 5 TABLET ORAL at 08:07

## 2025-07-13 RX ADMIN — MUPIROCIN: 20 OINTMENT TOPICAL at 21:15

## 2025-07-13 RX ADMIN — OXYCODONE 5 MG: 5 TABLET ORAL at 14:59

## 2025-07-13 RX ADMIN — APIXABAN 5 MG: 5 TABLET, FILM COATED ORAL at 21:16

## 2025-07-13 RX ADMIN — ATORVASTATIN CALCIUM 40 MG: 40 TABLET, FILM COATED ORAL at 21:16

## 2025-07-13 RX ADMIN — TORSEMIDE 40 MG: 10 TABLET ORAL at 08:07

## 2025-07-13 RX ADMIN — IPRATROPIUM BROMIDE AND ALBUTEROL SULFATE 1 DOSE: .5; 3 SOLUTION RESPIRATORY (INHALATION) at 20:43

## 2025-07-13 RX ADMIN — IPRATROPIUM BROMIDE AND ALBUTEROL SULFATE 1 DOSE: .5; 3 SOLUTION RESPIRATORY (INHALATION) at 13:30

## 2025-07-13 RX ADMIN — PSYLLIUM HUSK 1 PACKET: 3.4 POWDER ORAL at 08:06

## 2025-07-13 RX ADMIN — SACUBITRIL AND VALSARTAN 0.5 TABLET: 24; 26 TABLET, FILM COATED ORAL at 08:07

## 2025-07-13 RX ADMIN — SACUBITRIL AND VALSARTAN 0.5 TABLET: 24; 26 TABLET, FILM COATED ORAL at 21:16

## 2025-07-13 ASSESSMENT — PAIN SCALES - GENERAL
PAINLEVEL_OUTOF10: 5
PAINLEVEL_OUTOF10: 0
PAINLEVEL_OUTOF10: 5
PAINLEVEL_OUTOF10: 3
PAINLEVEL_OUTOF10: 0
PAINLEVEL_OUTOF10: 5

## 2025-07-13 ASSESSMENT — PULMONARY FUNCTION TESTS
PEFR_L/MIN: 18
PEFR_L/MIN: 18

## 2025-07-13 ASSESSMENT — PAIN DESCRIPTION - LOCATION
LOCATION: CHEST;RIB CAGE
LOCATION: RIB CAGE
LOCATION: CHEST

## 2025-07-13 ASSESSMENT — PAIN DESCRIPTION - ORIENTATION
ORIENTATION: LEFT
ORIENTATION: LEFT
ORIENTATION: RIGHT;LEFT;MID

## 2025-07-13 ASSESSMENT — PAIN DESCRIPTION - DESCRIPTORS
DESCRIPTORS: ACHING
DESCRIPTORS: STABBING
DESCRIPTORS: ACHING

## 2025-07-13 NOTE — PLAN OF CARE
Problem: Chronic Conditions and Co-morbidities  Goal: Patient's chronic conditions and co-morbidity symptoms are monitored and maintained or improved  Outcome: Progressing     Problem: Discharge Planning  Goal: Discharge to home or other facility with appropriate resources  Outcome: Progressing     Problem: Pain  Goal: Verbalizes/displays adequate comfort level or baseline comfort level  Outcome: Progressing     Problem: Safety - Adult  Goal: Free from fall injury  Outcome: Progressing     Problem: ABCDS Injury Assessment  Goal: Absence of physical injury  Outcome: Progressing     Problem: Cardiovascular - Adult  Goal: Maintains optimal cardiac output and hemodynamic stability  Outcome: Progressing     Problem: Skin/Tissue Integrity - Adult  Goal: Incisions, wounds, or drain sites healing without S/S of infection  Outcome: Progressing     Problem: Gastrointestinal - Adult  Goal: Minimal or absence of nausea and vomiting  Outcome: Progressing     Problem: Nutrition Deficit:  Goal: Optimize nutritional status  Outcome: Progressing     Problem: Neurosensory - Adult  Goal: Achieves stable or improved neurological status  7/13/2025 0912 by Alice Sutton, RN  Outcome: Progressing  7/12/2025 2214 by Shanel Blanton, RN  Outcome: Progressing     Problem: Respiratory - Adult  Goal: Achieves optimal ventilation and oxygenation  Outcome: Progressing

## 2025-07-13 NOTE — PROGRESS NOTES
Phone: [phone] [facility]  Date: 2025  Fax: [phone]      Physical Therapy    Daily Note    Patient Name: Mike Hensley      : 1955  (70 y.o.)  MRN: [id number]     Assessment: Pt up in chair upon arrival. He is agreeable to participate in therapy. Transfers out of chair and amb into hallway with SBA.Pt amb with FWW x 40 feet. To PT rm via WC. Performed strengthening ex as outlined. After ex pt c/o fatigue and refused another walk other than 8 feet back to chair. No LOB with amb/transfers. Will progress amb as sameera.          Call light in reach, Phone in reach, and Left in chair  Time In: 50  Time Out: 930  Timed Coded Minutes: 40  Total Treatment Time: 40       Exercises:  See Flowsheets    Plan  Cont Per Plan Of Care    Goals  Short Term Goals     Short Term Goal 1: STG=LTG                Long Term Goals  Time Frame for Long Term Goals : 7 days - expires 25  Long Term Goal 1: Complete basic transfers independently to return home safely  Long Term Goal 2: Ambulate with wh walker or least assistive device x 100ft with supervision to safely negotiate home environment  Long Term Goal 3: Good endurance to complete above ambulation and self-care tasks to reduce falls risk associated with fatigue  Long Term Goal 4: Good dynamic standing balance to complete self-care tasks       Ariana Choi Therapy License Number: [id number]    Date: 2025

## 2025-07-14 PROCEDURE — 1200000002 HC SEMI PRIVATE SWING BED

## 2025-07-14 PROCEDURE — 94640 AIRWAY INHALATION TREATMENT: CPT

## 2025-07-14 PROCEDURE — 6370000000 HC RX 637 (ALT 250 FOR IP): Performed by: INTERNAL MEDICINE

## 2025-07-14 PROCEDURE — 97110 THERAPEUTIC EXERCISES: CPT

## 2025-07-14 PROCEDURE — 94761 N-INVAS EAR/PLS OXIMETRY MLT: CPT

## 2025-07-14 PROCEDURE — 97116 GAIT TRAINING THERAPY: CPT

## 2025-07-14 PROCEDURE — 94669 MECHANICAL CHEST WALL OSCILL: CPT

## 2025-07-14 PROCEDURE — 6360000002 HC RX W HCPCS: Performed by: INTERNAL MEDICINE

## 2025-07-14 PROCEDURE — 2700000000 HC OXYGEN THERAPY PER DAY

## 2025-07-14 RX ADMIN — SACUBITRIL AND VALSARTAN 0.5 TABLET: 24; 26 TABLET, FILM COATED ORAL at 20:42

## 2025-07-14 RX ADMIN — TORSEMIDE 40 MG: 10 TABLET ORAL at 08:02

## 2025-07-14 RX ADMIN — PSYLLIUM HUSK 1 PACKET: 3.4 POWDER ORAL at 08:01

## 2025-07-14 RX ADMIN — IPRATROPIUM BROMIDE AND ALBUTEROL SULFATE 1 DOSE: .5; 3 SOLUTION RESPIRATORY (INHALATION) at 20:10

## 2025-07-14 RX ADMIN — APIXABAN 5 MG: 5 TABLET, FILM COATED ORAL at 20:42

## 2025-07-14 RX ADMIN — ATORVASTATIN CALCIUM 40 MG: 40 TABLET, FILM COATED ORAL at 20:42

## 2025-07-14 RX ADMIN — METOPROLOL SUCCINATE 25 MG: 25 TABLET, EXTENDED RELEASE ORAL at 20:42

## 2025-07-14 RX ADMIN — PSYLLIUM HUSK 1 PACKET: 3.4 POWDER ORAL at 20:42

## 2025-07-14 RX ADMIN — SACUBITRIL AND VALSARTAN 0.5 TABLET: 24; 26 TABLET, FILM COATED ORAL at 08:02

## 2025-07-14 RX ADMIN — DIGOXIN 125 MCG: 125 TABLET ORAL at 08:02

## 2025-07-14 RX ADMIN — IPRATROPIUM BROMIDE AND ALBUTEROL SULFATE 1 DOSE: .5; 3 SOLUTION RESPIRATORY (INHALATION) at 14:05

## 2025-07-14 RX ADMIN — OXYCODONE 5 MG: 5 TABLET ORAL at 21:47

## 2025-07-14 RX ADMIN — EMPAGLIFLOZIN 10 MG: 10 TABLET, FILM COATED ORAL at 08:02

## 2025-07-14 RX ADMIN — MUPIROCIN: 20 OINTMENT TOPICAL at 08:04

## 2025-07-14 RX ADMIN — IPRATROPIUM BROMIDE AND ALBUTEROL SULFATE 1 DOSE: .5; 3 SOLUTION RESPIRATORY (INHALATION) at 09:25

## 2025-07-14 RX ADMIN — BUDESONIDE 250 MCG: 0.25 SUSPENSION RESPIRATORY (INHALATION) at 20:10

## 2025-07-14 RX ADMIN — GABAPENTIN 100 MG: 100 CAPSULE ORAL at 20:42

## 2025-07-14 RX ADMIN — TRAZODONE HYDROCHLORIDE 50 MG: 50 TABLET ORAL at 20:42

## 2025-07-14 RX ADMIN — APIXABAN 5 MG: 5 TABLET, FILM COATED ORAL at 08:02

## 2025-07-14 RX ADMIN — ASPIRIN 81 MG: 81 TABLET, CHEWABLE ORAL at 08:02

## 2025-07-14 RX ADMIN — MUPIROCIN: 20 OINTMENT TOPICAL at 20:43

## 2025-07-14 RX ADMIN — AMIODARONE HYDROCHLORIDE 200 MG: 200 TABLET ORAL at 08:02

## 2025-07-14 RX ADMIN — PANTOPRAZOLE SODIUM 40 MG: 40 TABLET, DELAYED RELEASE ORAL at 05:30

## 2025-07-14 RX ADMIN — BUDESONIDE 250 MCG: 0.25 SUSPENSION RESPIRATORY (INHALATION) at 09:31

## 2025-07-14 RX ADMIN — GABAPENTIN 100 MG: 100 CAPSULE ORAL at 08:02

## 2025-07-14 RX ADMIN — ALLOPURINOL 100 MG: 100 TABLET ORAL at 08:02

## 2025-07-14 RX ADMIN — METOPROLOL SUCCINATE 25 MG: 25 TABLET, EXTENDED RELEASE ORAL at 08:02

## 2025-07-14 RX ADMIN — Medication 400 MG: at 20:42

## 2025-07-14 ASSESSMENT — PAIN DESCRIPTION - DESCRIPTORS: DESCRIPTORS: ACHING

## 2025-07-14 ASSESSMENT — PAIN DESCRIPTION - LOCATION: LOCATION: OTHER (COMMENT)

## 2025-07-14 ASSESSMENT — PAIN SCALES - GENERAL
PAINLEVEL_OUTOF10: 0
PAINLEVEL_OUTOF10: 0
PAINLEVEL_OUTOF10: 6
PAINLEVEL_OUTOF10: 0

## 2025-07-14 ASSESSMENT — PAIN - FUNCTIONAL ASSESSMENT: PAIN_FUNCTIONAL_ASSESSMENT: NONE - DENIES PAIN

## 2025-07-14 ASSESSMENT — PAIN DESCRIPTION - ORIENTATION: ORIENTATION: MID

## 2025-07-14 NOTE — PROGRESS NOTES
Hospitalist Progress Note  7/14/2025 6:12 AM  Subjective:   Admit Date: 7/10/2025  PCP: Cheyenne Arteaga, APRN - CNP    Interval History: Mike has no complaints this morning.  The pain in his chest from CABG is controlled.  Breathing is doing well with an occasional cough with sputum.  Appetite is good, no nausea.  Bowels moving and he denies any trouble urinating.  He feels he is getting stronger with therapy.      Diet: ADULT DIET; Easy to Chew; Low Sodium (2 gm); 2000 ml  Medications:   Scheduled Meds:   metoprolol succinate  25 mg Oral BID    mupirocin   Topical BID    allopurinol  100 mg Oral Daily    amiodarone  200 mg Oral Daily    aspirin  81 mg Oral Daily    atorvastatin  40 mg Oral Nightly    budesonide  250 mcg Nebulization BID    digoxin  125 mcg Oral Daily    gabapentin  100 mg Oral 2 times per day    magnesium oxide  400 mg Oral Nightly    pantoprazole  40 mg Oral QAM AC    psyllium husk-aspartame  1 packet Oral BID    sacubitril-valsartan  0.5 tablet Oral BID    torsemide  40 mg Oral Daily    traZODone  50 mg Oral Nightly    apixaban  5 mg Oral BID    chlorhexidine  15 mL Mouth/Throat BID    empagliflozin  10 mg Oral Daily    ipratropium 0.5 mg-albuterol 2.5 mg  1 Dose Inhalation TID     Continuous Infusions:    Patient's current medications documented, reviewed, and updated.      CBC:   Recent Labs     07/12/25  0420   WBC 8.3   HGB 10.9*        BMP:    Recent Labs     07/12/25  0420      K 3.7      CO2 27   BUN 27*   CREATININE 1.3*   GLUCOSE 96     Hepatic: No results for input(s): \"AST\", \"ALT\", \"BILITOT\", \"ALKPHOS\" in the last 72 hours.    Invalid input(s): \"ALB\"  Troponin: No results for input(s): \"TROPONINI\" in the last 72 hours.  BNP: No results for input(s): \"BNP\" in the last 72 hours.  Lipids: No results for input(s): \"CHOL\", \"HDL\" in the last 72 hours.    Invalid input(s): \"LDLCALCU\"  INR: No results for input(s): \"INR\" in the last 72 hours.      Objective:   Vitals: BP

## 2025-07-14 NOTE — PROGRESS NOTES
Pharmacist Home Medication Verification    Reviewed/verified home medication supply using drug identification software for inpatient use. No barcode, no NDC, no 'liquid' option for hexylresorcinol 0.1% so entered under non-formulary and used information off of patient's bottle. Label provided to RN. Chlorhexidine order discontinued.     Luis Manuel Miramontes, PharmMALLORY 7/14/2025 9:56 AM    auscultated

## 2025-07-14 NOTE — PROGRESS NOTES
Phone: [phone] [facility]  Date: 2025  Fax: [phone]      Physical Therapy    Daily Note    Patient Name: Mike Hensley      : 1955  (70 y.o.)  MRN: [id number]      Assessment                     Sit to Stand: Stand by assistance, Supervision  Stand to Sit: Stand by assistance, Supervision                WB Status: gait with wheeled walker, SBA/supervision and w/c follow ~35 ft x1; 15 ft x1 (1L 02)                Assessment: Patient in chair upon arrival to room.  States he had to have O2 reapplied at 1L this morning due to an exacerbation. Sit to stand from chair is supervision. Ambulates with wheeled walker and SBA/supervision ~35 ftx1 before stating he needs to sit. Wheeled to therapy room and completes exercises as outlined above.  Attempted to complete standing exercises in // bars but patient notes fatigue.  He is able to  // ~30-45 sec before stating he needs to sit down. Wheeled back to room. Ambulates from w/c back to bedside ginger ~15 ft x1 Up in chair following with call light in reach.  Safety Devices  Type of Devices: Call light within reach, Gait belt, Left in chair, Nurse notified          Call light in reach, Phone in reach, Use of Gait belt, Nurse Notified, and Left in chair  Time In: 0954  Time Out: 1035  Timed Coded Minutes: 31  Total Treatment Time: 41       Exercises:  See Flowsheets    Plan  Cont Per Plan Of Care    Goals  Short Term Goals     Short Term Goal 1: STG=LTG                Long Term Goals  Time Frame for Long Term Goals : 7 days - expires 25  Long Term Goal 1: Complete basic transfers independently to return home safely  Long Term Goal 2: Ambulate with wh walker or least assistive device x 100ft with supervision to safely negotiate home environment  Long Term Goal 3: Good endurance to complete above ambulation and self-care tasks to reduce falls risk associated with fatigue  Long Term Goal 4: Good dynamic standing balance to complete

## 2025-07-14 NOTE — PROGRESS NOTES
Comprehensive Nutrition Assessment    Type and Reason for Visit:  Reassess    Nutrition Recommendations/Plan:   Encourage oral intakes.   Softer foods as needed.      Malnutrition Assessment:  Malnutrition Status:  No malnutrition (07/11/25 0933)    Context:  Acute Illness     Findings of the 6 clinical characteristics of malnutrition:  Energy Intake:  No decrease in energy intake  Weight Loss:  No weight loss     Body Fat Loss:  No body fat loss     Muscle Mass Loss:  No muscle mass loss    Fluid Accumulation:  Moderate to Severe Extremities   Strength:  Not Performed    Nutrition Assessment:    Continued increased nutrient needs post CABG. Reports doing well with meals and getting foods soft enough for his needs. Is complimentary of foods overall. Mild weight decreases with less edema reported as well. Denies nutrition questions or concerns currently.    Nutrition Related Findings:    active b/s. formed bm. +2 BLE edema (better) Wound Type: Surgical Incision       Current Nutrition Intake & Therapies:    Average Meal Intake: %  Average Supplements Intake: None Ordered (was taking some at [facility])  ADULT DIET; Easy to Chew; Low Sodium (2 gm); 2000 ml    Anthropometric Measures:  Height: 182.9 cm (6')  Ideal Body Weight (IBW): 178 lbs (81 kg)    Admission Body Weight: 119.9 kg (264 lb 5.3 oz)  Current Body Weight: 119.3 kg (263 lb), 148.5 % IBW. Weight Source: Bed scale  Current BMI (kg/m2): 35.7  Usual Body Weight: 122.5 kg (270 lb) (2022)     % Weight Change (Calculated): -2.6  Weight Adjustment For: No Adjustment                 BMI Categories: Obese Class 2 (BMI 35.0 -39.9)    Estimated Daily Nutrient Needs:  Energy Requirements Based On: Kcal/kg  Weight Used for Energy Requirements: Current  Energy (kcal/day): 4102-1687 (18-20)  Weight Used for Protein Requirements: Ideal  Protein (g/day): 105-121 (1.3-1.5)  Method Used for Fluid Requirements: 1 ml/kcal  Fluid (ml/day): 2300    Nutrition Diagnosis:

## 2025-07-14 NOTE — PROGRESS NOTES
Swollen area noted to left forearm-report given that this is the result of an infiltration at [facility].  Warm to touch, non tender, raised.  Raised area marked and image taken.  Dr. Belle made aware via perfect serve.

## 2025-07-14 NOTE — PROGRESS NOTES
Phone: [phone] [facility]  Date: 2025  Fax: [phone]      Physical Therapy    Daily Note    Patient Name: Mike Hensley      : 1955  (70 y.o.)  MRN: [id number]      Assessment                     Sit to Stand: Stand by assistance, Supervision  Stand to Sit: Stand by assistance, Supervision                WB Status: gait with wheeled walker ~30 ft x1; 15 ft x1                Assessment: Patient in chair visiting with friend upon arrival. Nurse also in room checking vitals. Sit to stand from chair is supervision. Ambulates with wheeled walker ~30 ft with supervision and then requests to sit down. Wheeled to therapy room to complete seated and standing B LE exercises.  Notes some dizziness with standing marching and only able to do 5-6 repititions.  Wheeled back to room following. Sit to stand from w/c is supervision. Ambulates ~15 ft back to chair with wheeled walker.  Up in chair following with call light in reach. Friend remains in room with patient following session.  Safety Devices  Type of Devices: Call light within reach, Gait belt, Left in chair          Call light in reach, Phone in reach, Use of Gait belt, Family Present, and Left in chair  Time In: 1438  Time Out: 1408  Timed Coded Minutes: 30  Total Treatment Time: 30       Exercises:  See Flowsheets    Plan  Cont Per Plan Of Care    Goals  Short Term Goals     Short Term Goal 1: STG=LTG                Long Term Goals  Time Frame for Long Term Goals : 7 days - expires 25  Long Term Goal 1: Complete basic transfers independently to return home safely  Long Term Goal 2: Ambulate with wh walker or least assistive device x 100ft with supervision to safely negotiate home environment  Long Term Goal 3: Good endurance to complete above ambulation and self-care tasks to reduce falls risk associated with fatigue  Long Term Goal 4: Good dynamic standing balance to complete self-care tasks       Shagufta VUONG [facility] Therapy License

## 2025-07-14 NOTE — PROGRESS NOTES
[facility]  Physical Therapy    Date: 2025  Patient Name: Mike Hensley        : 1955       [x] Pt Refusal Patient seated up in chair and states he just received a warm blanket.  Would like for PTA to return later.  Will check back and progress as able.           [] Pt Unavailable due to:          Shagufta Benson, PTA Date: 2025

## 2025-07-14 NOTE — RT PROTOCOL NOTE
RESPIRATORY ASSESSMENT PROTOCOL                                                                                              Patient Name: Mike Hensley Room#: 0271/0271-01 : 1955     Admitting diagnosis: Weakness [R53.1]       Medical History:   Past Medical History:   Diagnosis Date    Atrial fibrillation (HCC)     CAD (coronary artery disease)     CHF (congestive heart failure) (HCC)     Chronic kidney disease     COPD (chronic obstructive pulmonary disease) (HCC)     ETOH abuse     Hyperlipidemia     Hypertension     Lung nodules     STARR (obstructive sleep apnea)     Peripheral vascular disease     Squamous cell carcinoma of rectum (HCC)     With lymph node resection, 33 Rx of Radiation + Chemo       PATIENT ASSESSMENT    LABORATORY DATA  Hematology:   Lab Results   Component Value Date/Time    WBC 8.3 2025 04:20 AM    RBC 3.75 2025 04:20 AM    HGB 10.9 2025 04:20 AM    HCT 35.2 2025 04:20 AM     2025 04:20 AM     Chemistry:  No results found for: \"PHART\", \"QHI9SRY\", \"PO2ART\", \"I7OBHCVK\", \"UPD6JTJ\", \"PBEA\", \"NBEA\"    VITALS  Pulse: 100   Respirations: 16  BP: 109/84  SpO2: 96 % O2 Device: None (Room air)  Temp: 98.9 °F (37.2 °C)    SKIN COLOR  [x] Normal  [] Pale  [] Dusky  [] Cyanotic    RESPIRATORY PATTERN  [x] Normal  [] Dyspnea  [] Cheyne-Finley  [] Kussmaul  [] Biots    AMBULATORY  [x] Yes  [] No  [x] With Assistance    PEAK FLOW  Predicted:     Personal Best:            Patient Acuity 0 1 2 3 4 Score   Level of Consciousness (LOC) [x]  Alert & Oriented or Pt normal LOC []  Confused;follows directions []  Confused & uncooper-ative []  Obtunded []  Comatose 0   Respiratory Rate  (RR) [x]  Reg. rate & pattern. 12 - 20 bpm  []  Increased RR. Greater than 20 bpm   []  SOB w/ exertion or RR greater than 24 bpm []  Access- ory muscle use at rest. Abn.  resp. []  SOB at rest.   0   Bilateral Breath Sounds (BBS) []  Clear [x]  Diminish-ed bases  []  Diminish-ed t/o, or  use MP.  Notify physician if condition deteriorates. MDI THERAPY with  2 actuations of [physician-ordered bronchodilator(s)] via spacer TID and Albuterol PRN q4 hrs.   If unable to utilize MDI: HHN [physician-ordered bronchodilator(s)] TID and Albuterol PRN q4 hrs.   Notify physician if condition deteriorates. MDI THERAPY with  [physician-ordered bronchodilator(s)] via spacer TID PRN.   If unable to utilize MDI: HHN [physician-ordered bronchodilator(s)] TID PRN.   Notify physician if condition deteriorates.         If Acuity Level is 2, 3, or 4 in any of the following:    [] COUGH     [] SURGICAL HISTORY (SURG HX)  [] CHEST XRAY (CXR)    Goal: Improvement in sputum mobilization in patients with ineffective airway clearance. Reverse atelectasis.    [] Bronchopulmonary Hygiene Protocol      Total Acuity:   14-28  []  Secondary Assessment in 24 hrs Total Acuity:  9-13  []  Secondary Assessment in 24 hrs Total Acuity:  4-8  [x]  Secondary Assessment in 48 hrs Total Acuity:  0-3  []  Secondary Assessment in 48 hrs      PEP therapy- EZPAP QID, Oscillatory Therapy (Acapella) QID if CXR Acuity = 4, consider METANEB QID with physician-  ordered medications. NT Sxn PRN for ineffective cough    PEP therapy-(EZPAP)  QID  And Oscillatory therapy- Acapella QID  NT Sxn PRN for ineffective cough PD&P, or Oscillatory Therapy (Acapella) TID and PRN and PEP (EZPAP) TID & PRN Instruct patient to self-perform IS q1hr WA       If Acuity Level is 2 or above in the following:    [] PULMONARY HISTORY (PULM HX)    Goal: Assist patient in quitting smoking to slow or stop the progression of lung disease.    [] Smoking Cessation Protocol    SMOKING CESSATION EDUCATION provided according to policy RT_201: (roberto carlos with an X)  ____Yes    ____ No     ____ NA    Smoking Cessation Booklet given:  ____Yes  ____No ____Patient Refused

## 2025-07-15 PROCEDURE — 97535 SELF CARE MNGMENT TRAINING: CPT

## 2025-07-15 PROCEDURE — 97116 GAIT TRAINING THERAPY: CPT

## 2025-07-15 PROCEDURE — 2700000000 HC OXYGEN THERAPY PER DAY

## 2025-07-15 PROCEDURE — 6370000000 HC RX 637 (ALT 250 FOR IP): Performed by: INTERNAL MEDICINE

## 2025-07-15 PROCEDURE — 97110 THERAPEUTIC EXERCISES: CPT

## 2025-07-15 PROCEDURE — 6360000002 HC RX W HCPCS: Performed by: INTERNAL MEDICINE

## 2025-07-15 PROCEDURE — 1200000002 HC SEMI PRIVATE SWING BED

## 2025-07-15 PROCEDURE — 94761 N-INVAS EAR/PLS OXIMETRY MLT: CPT

## 2025-07-15 PROCEDURE — 94669 MECHANICAL CHEST WALL OSCILL: CPT

## 2025-07-15 PROCEDURE — 94640 AIRWAY INHALATION TREATMENT: CPT

## 2025-07-15 RX ADMIN — PANTOPRAZOLE SODIUM 40 MG: 40 TABLET, DELAYED RELEASE ORAL at 05:59

## 2025-07-15 RX ADMIN — ALLOPURINOL 100 MG: 100 TABLET ORAL at 07:49

## 2025-07-15 RX ADMIN — OXYCODONE 5 MG: 5 TABLET ORAL at 03:47

## 2025-07-15 RX ADMIN — IPRATROPIUM BROMIDE AND ALBUTEROL SULFATE 1 DOSE: .5; 3 SOLUTION RESPIRATORY (INHALATION) at 20:09

## 2025-07-15 RX ADMIN — APIXABAN 5 MG: 5 TABLET, FILM COATED ORAL at 21:01

## 2025-07-15 RX ADMIN — EMPAGLIFLOZIN 10 MG: 10 TABLET, FILM COATED ORAL at 07:48

## 2025-07-15 RX ADMIN — DIGOXIN 125 MCG: 125 TABLET ORAL at 07:48

## 2025-07-15 RX ADMIN — METOPROLOL SUCCINATE 25 MG: 25 TABLET, EXTENDED RELEASE ORAL at 07:49

## 2025-07-15 RX ADMIN — OXYCODONE 5 MG: 5 TABLET ORAL at 22:15

## 2025-07-15 RX ADMIN — SACUBITRIL AND VALSARTAN 0.5 TABLET: 24; 26 TABLET, FILM COATED ORAL at 21:01

## 2025-07-15 RX ADMIN — TRAZODONE HYDROCHLORIDE 50 MG: 50 TABLET ORAL at 21:01

## 2025-07-15 RX ADMIN — MUPIROCIN: 20 OINTMENT TOPICAL at 21:02

## 2025-07-15 RX ADMIN — METOPROLOL SUCCINATE 25 MG: 25 TABLET, EXTENDED RELEASE ORAL at 21:01

## 2025-07-15 RX ADMIN — BUDESONIDE 250 MCG: 0.25 SUSPENSION RESPIRATORY (INHALATION) at 11:19

## 2025-07-15 RX ADMIN — AMIODARONE HYDROCHLORIDE 200 MG: 200 TABLET ORAL at 07:49

## 2025-07-15 RX ADMIN — MUPIROCIN: 20 OINTMENT TOPICAL at 07:52

## 2025-07-15 RX ADMIN — ATORVASTATIN CALCIUM 40 MG: 40 TABLET, FILM COATED ORAL at 21:01

## 2025-07-15 RX ADMIN — GABAPENTIN 100 MG: 100 CAPSULE ORAL at 21:01

## 2025-07-15 RX ADMIN — ASPIRIN 81 MG: 81 TABLET, CHEWABLE ORAL at 07:48

## 2025-07-15 RX ADMIN — BUDESONIDE 250 MCG: 0.25 SUSPENSION RESPIRATORY (INHALATION) at 20:09

## 2025-07-15 RX ADMIN — PSYLLIUM HUSK 1 PACKET: 3.4 POWDER ORAL at 21:07

## 2025-07-15 RX ADMIN — IPRATROPIUM BROMIDE AND ALBUTEROL SULFATE 1 DOSE: .5; 3 SOLUTION RESPIRATORY (INHALATION) at 16:31

## 2025-07-15 RX ADMIN — OXYCODONE 5 MG: 5 TABLET ORAL at 15:53

## 2025-07-15 RX ADMIN — TORSEMIDE 40 MG: 10 TABLET ORAL at 07:48

## 2025-07-15 RX ADMIN — APIXABAN 5 MG: 5 TABLET, FILM COATED ORAL at 07:49

## 2025-07-15 RX ADMIN — GABAPENTIN 100 MG: 100 CAPSULE ORAL at 07:48

## 2025-07-15 RX ADMIN — Medication 400 MG: at 21:02

## 2025-07-15 RX ADMIN — SACUBITRIL AND VALSARTAN 0.5 TABLET: 24; 26 TABLET, FILM COATED ORAL at 07:48

## 2025-07-15 RX ADMIN — IPRATROPIUM BROMIDE AND ALBUTEROL SULFATE 1 DOSE: .5; 3 SOLUTION RESPIRATORY (INHALATION) at 11:19

## 2025-07-15 ASSESSMENT — PAIN DESCRIPTION - ORIENTATION
ORIENTATION: RIGHT;LEFT
ORIENTATION: RIGHT;LEFT

## 2025-07-15 ASSESSMENT — PAIN SCALES - GENERAL
PAINLEVEL_OUTOF10: 3
PAINLEVEL_OUTOF10: 0
PAINLEVEL_OUTOF10: 6
PAINLEVEL_OUTOF10: 6
PAINLEVEL_OUTOF10: 7

## 2025-07-15 ASSESSMENT — PAIN DESCRIPTION - LOCATION
LOCATION: ANKLE
LOCATION: CHEST
LOCATION: ANKLE

## 2025-07-15 ASSESSMENT — PAIN - FUNCTIONAL ASSESSMENT
PAIN_FUNCTIONAL_ASSESSMENT: NONE - DENIES PAIN
PAIN_FUNCTIONAL_ASSESSMENT: 0-10
PAIN_FUNCTIONAL_ASSESSMENT: 0-10

## 2025-07-15 ASSESSMENT — PAIN DESCRIPTION - DESCRIPTORS
DESCRIPTORS: ACHING

## 2025-07-15 NOTE — PROGRESS NOTES
Phone: [phone] [facility]  Date: 7/15/2025  Fax: [phone]      Physical Therapy    Daily Note    Patient Name: Mike Hensley      : 1955  (70 y.o.)  MRN: [id number]      Assessment                     Sit to Stand: Stand by assistance, Supervision  Stand to Sit: Stand by assistance, Supervision                WB Status: gait with wheeled waker and w/c follow ~65 ft x2 with SBA/CGA                Assessment: Co-treat with DARRON today. Patient is seated up in chair upon arrival with friend also in room. Sit to stand from chair is superivision/SBA. Ambualtes ~65f ft x1 with wheeled walker and SBA/supervision.  Wheeled remaining distance to therapy room. Completes seated B LE exercises as outlined above.  Notes fatigue following but agrees to ambulate back to room. Gait with wheeled walker ~65 ftx1 back to room with w/c follow. Notes dizziness during gait.  Returns to room to sit up in chair.  Call light in reach. Friend remains with patient during entire session and following.  Safety Devices  Type of Devices: Call light within reach, Gait belt, Left in chair          Call light in reach, Phone in reach, Use of Gait belt, Family Present, and Left in chair  Time In: 1436  Time Out: 1513  Timed Coded Minutes:  19 ( co-treat with DARRON)  Total Treatment Time: 37       Exercises:  See Flowsheets    Plan  Cont Per Plan Of Care    Goals  Short Term Goals     Short Term Goal 1: STG=LTG                Long Term Goals  Time Frame for Long Term Goals : 7 days - expires 25  Long Term Goal 1: Complete basic transfers independently to return home safely  Long Term Goal 2: Ambulate with wh walker or least assistive device x 100ft with supervision to safely negotiate home environment  Long Term Goal 3: Good endurance to complete above ambulation and self-care tasks to reduce falls risk associated with fatigue  Long Term Goal 4: Good dynamic standing balance to complete self-care tasks       Shagufta Benson

## 2025-07-15 NOTE — PROGRESS NOTES
[facility]  Occupational Therapy    Date: 7/15/2025  Patient Name: Mike Hensley        : 1955       [] Pt Refusal           [x] Pt Unavailable due to:  Attempted to see patient at 1410, currently receiving education from cardio-pulmonary therapist re: cardiac rehab services. Will check back.        RANDI Boss  Date: 7/15/2025

## 2025-07-15 NOTE — CARE COORDINATION
07/15/25 1340   IMM Letter   Notice of Medicare Non-Coverage Letter date given: 07/15/25   Notice of Medicare Non-Coverage Time Given 1338   Notice of Medicare Non-Coverage Letter Given By Sarah GTZW     Medicare notice of non coverage letter issued to pt for discharge of skilled services on Thursday 7/17/2025 and home on Friday 7/18/2025. Pt to have cardiac rehab for follow up and has all needed DME. Pt in agreement with discharge plans and timing and signs letter. Copies made and provided to pt and paper chart. SW following. MAG Sampson, LSW 7/15/2025

## 2025-07-15 NOTE — PROGRESS NOTES
Phone: [phone] [facility]  Date: 7/15/2025  Fax: [phone]      Physical Therapy    Daily Note    Patient Name: Mike Hensley      : 1955  (70 y.o.)  MRN: [id number]      Assessment                     Sit to Stand: Stand by assistance, Supervision  Stand to Sit: Stand by assistance, Supervision                WB Status: gait with wheeled walker and w/c follow ~65 ft x1 with supervision/SBA                Assessment: Patient in chair after taking a shower with DARRON. He agrees to work with PTA. Sit to stand from chair is supervision. Ambulates with wheeled walker and w/c follow ~65 ftx1 before requesting to sit down and rest. He is wheeled remaining distance to therapy room. Completes seated exercises and nustep as outlined above. Some fatigue noted during session.Wheeled back to room following and is able to ambulate from doorway back to chair with wheeled walker and superivision. Up in chair following with call light in reach.  Safety Devices  Type of Devices: Call light within reach, Gait belt, Left in chair, Nurse notified          Call light in reach, Phone in reach, Use of Gait belt, Nurse Notified, and Left in chair  Time In: 0948  Time Out: 1022  Timed Coded Minutes: 34  Total Treatment Time: 34       Exercises:  See Flowsheets    Plan  Cont Per Plan Of Care    Goals  Short Term Goals     Short Term Goal 1: STG=LTG                Long Term Goals  Time Frame for Long Term Goals : 7 days - expires 25  Long Term Goal 1: Complete basic transfers independently to return home safely  Long Term Goal 2: Ambulate with wh walker or least assistive device x 100ft with supervision to safely negotiate home environment  Long Term Goal 3: Good endurance to complete above ambulation and self-care tasks to reduce falls risk associated with fatigue  Long Term Goal 4: Good dynamic standing balance to complete self-care tasks       Shagufta Benson Therapy License Number: [id number]    Date: 7/15/2025

## 2025-07-15 NOTE — PROGRESS NOTES
[facility]  Swing Bed Interdisciplinary Care Plan Conference Report   Recertification for Continued Skilled Care.    Patients name:Mike Hensley  Date of Conference: 7/15/2025    The Following Information was discussed and agreed upon with the patient and/or Caregivers as listed below.    Names of Team Members and Caregivers present for meeting:  : Sarah Ho Nursing: Nick Clements  Therapy: Shagufta Benson PTA;  L/OTR  Dietician:   Activities: Rachel Poole  Pastoral Care:   Caregivers:    [] Spouse  [] Child/Children [] Significant Other   [] Other:     Nutrition:  Current Body Weight:   Wt Readings from Last 1 Encounters:   07/15/25 119.4 kg (263 lb 3.7 oz)     Weight Change: mild decrease with lesser edema.   Current Diet order:ADULT DIET; Easy to Chew; Low Sodium (2 gm); 2000 ml  Intakes: % meals   Diet Education Provided: Continued encouragement   Goal: PO >75% meals and supplements    Spiritual:  Mosque needs met: [x] Yes  [] No  [] N/A  Notified  or  of admission: [] Yes  [] No  [x] N/A  Patient added to Communion List: [] Yes  [] No  [x] N/A  Any other concerns or comments:   Goal: Participate 2-3 times per week    Occupational Therapy:  Current ADL Status: ADL  Feeding: Modified independent   Grooming: Supervision  UE Bathing: Supervision  LE Bathing: Minimal assistance  UE Dressing: Modified independent   LE Dressing: Minimal assistance  Putting On/Taking Off Footwear: Minimal assistance  Toileting: Contact guard assistance  Toileting Skilled Clinical Factors: Toileting completed at evaluation, with CGA. VC for safety with manipulation of walker and oxygen cord.  Functional Mobility: Contact guard assistance  Product Used : Soap and water  Safety: Good  Recommendations for adaptive equipment:    [] Long handled sponge   [] Long Handled Shoe Horn  [] Extended Tub Bench  Short Term Goals  Time Frame for Short Term Goals: 10 days (7/21/2025)  Short  [facility].    Physician signature certifies patient continued need for SNF inpatient care.

## 2025-07-15 NOTE — PLAN OF CARE
Problem: Chronic Conditions and Co-morbidities  Goal: Patient's chronic conditions and co-morbidity symptoms are monitored and maintained or improved  7/15/2025 0827 by Alice Sutton RN  Outcome: Progressing  7/15/2025 0257 by Sandro Pina RN  Outcome: Progressing     Problem: Discharge Planning  Goal: Discharge to home or other facility with appropriate resources  7/15/2025 0827 by Alice Sutton RN  Outcome: Progressing  7/15/2025 0257 by Sandro Pina RN  Outcome: Progressing     Problem: Pain  Goal: Verbalizes/displays adequate comfort level or baseline comfort level  7/15/2025 0827 by Alice Sutton RN  Outcome: Progressing  7/15/2025 0257 by Sandro Pina RN  Outcome: Progressing     Problem: Safety - Adult  Goal: Free from fall injury  7/15/2025 0827 by Alice Sutton RN  Outcome: Progressing  7/15/2025 0257 by Sandro Pina RN  Outcome: Progressing     Problem: ABCDS Injury Assessment  Goal: Absence of physical injury  7/15/2025 0827 by Alice Sutton RN  Outcome: Progressing  7/15/2025 0257 by Sandro Pina RN  Outcome: Progressing     Problem: Cardiovascular - Adult  Goal: Maintains optimal cardiac output and hemodynamic stability  7/15/2025 0827 by Alice Sutton RN  Outcome: Progressing  7/15/2025 0257 by Sandro Pina RN  Outcome: Progressing     Problem: Skin/Tissue Integrity - Adult  Goal: Incisions, wounds, or drain sites healing without S/S of infection  7/15/2025 0827 by Alice Sutton RN  Outcome: Progressing  7/15/2025 0257 by Sandro Pina RN  Outcome: Progressing     Problem: Gastrointestinal - Adult  Goal: Minimal or absence of nausea and vomiting  7/15/2025 0827 by Alice Sutton RN  Outcome: Progressing  7/15/2025 0257 by Sandro Pina RN  Outcome: Progressing     Problem: Nutrition Deficit:  Goal: Optimize nutritional status  7/15/2025 0827 by Alice Sutton RN  Outcome: Progressing  7/15/2025 0257 by Sandro Pina RN  Outcome: Progressing     Problem: Neurosensory -  Adult  Goal: Achieves stable or improved neurological status  7/15/2025 0827 by Alice Sutton RN  Outcome: Progressing  7/15/2025 0257 by Sandro Pina RN  Outcome: Progressing     Problem: Respiratory - Adult  Goal: Achieves optimal ventilation and oxygenation  7/15/2025 0827 by Alice Sutton RN  Outcome: Progressing  7/15/2025 0257 by Sandro Pina RN  Outcome: Progressing

## 2025-07-15 NOTE — PROGRESS NOTES
Spiritual Services Interventions  0271/0271-01   7/15/2025  Sr Sybil Hensley  70 y.o. year old male    Encounter Summary  Encounter Overview/Reason: Initial Encounter  Service Provided For: Patient  Referral/Consult From: Ricki  Last Encounter : 07/15/25  Complexity of Encounter: Moderate  Begin Time: 0830  End Time : 0900  Total Time Calculated: 30 min     Spiritual/Emotional needs  Type: Spiritual Support                    Assessment/Intervention/Outcome  Assessment: Coping, Hopeful  Intervention: Active listening, Sustaining Presence/Ministry of presence, Prayer (assurance of)/Olyphant  Outcome: Expressed Gratitude

## 2025-07-15 NOTE — PROGRESS NOTES
Swing bed IDT team meeting held this date regarding pt progress in swing bed. Therapy reports that pt is doing very well with them but does need to continue to work up endurance. Therapy feels that pt would be safe to go home at the end of this week if he would like to.     Swing bed coordinator, REJI and RN case manager met with pt to discuss above. Pt feels that he is doing very well and has everything that he needs at home. Pt in agreement with discharge to home on Friday and cardiac rehab follow up which he requests to do here. Pt has all needed DME and denies need for any additional services. Plan discharge to home on Friday 7/18/2025 with cardiac rehab follow up. Sarah Fowler, MSW, LSW 7/15/2025

## 2025-07-15 NOTE — PROGRESS NOTES
[facility]  Phone: [phone]    Occupational Therapy Skilled Daily Note  Date: 7/15/2025  Patient Name: Mike Hensley        MRN: [id number]    : 1955  (70 y.o.)    Subjective:     Subjective: Patient seated in recliner upon arrival, agreeable to OT interventions.    Pt is PROGRESSING toward goals and independence of Self Care this treatment session    Discharge recommendation: Continue to assess Pending Progress    Objective:     ADLs:  None    Transfers:  Sit to stand: Supervision   Stand to sit: Supervision      Assessment:     Assessment: Patient seated in recliner upon arrival, agreeable to OT interventions. Co-tx with PTA is completed due to patients limited activity tolerance. Ambulates from recliner to elevators using AD (fww) with wc follow. Wheeled remainder of way to therapy room. Once in therapy room patient engages in UB resistive exercises within cardiac precautions to tolerance w/ provided rest periods. Good tolerance of exercises are noted, requires instruction for PLB techniques. O2 and HR assessed throughout exercises, remains WNL throughout. Ambulates from elevators back to recliner w/ AD. SBA is provided throughout, no LOB is noted. Remains in recliner upon departure with  present. Will continue to address goals and progress patient as able/tolerated. Patient to d/c home 2025.    Exercises:     See Flowsheets    Goals:     Short term goals:  Time Frame for Short Term Goals: 10 days (2025)  Short Term Goal 1: Patient will complete UB/LB dressing tasks Luz Elena to increase safety and independence with tasks. -MET  Short Term Goal 2: Patient will tolerate standing while enaging in functional task/ADL for >5 minutes without rest break or loss of balance to improve safety and endurance for ADL tasks.  Short Term Goal 3: Patient will complete toileting routine Luz Elena to increase safety and independence for return home. -MET  Short Term Goal 4: Patient will

## 2025-07-15 NOTE — PROGRESS NOTES
[facility]  Physical Therapy    Date: 7/15/2025  Patient Name: Mike Hensley        : 1955       [] Pt Refusal           [x] Pt Unavailable due to:  Patient talking with cardiopulmonary therapist at present time regarding cardiac rehab services. Will check back and progress as able.        Shagufta Benson, PTA Date: 7/15/2025

## 2025-07-15 NOTE — PROGRESS NOTES
[facility]  Phone: [phone]    Occupational Therapy Skilled Daily Note  Date: 7/15/2025  Patient Name: Mike Hensley        MRN: [id number]    : 1955  (70 y.o.)    Subjective:     Subjective: Patient seated in recliner upon arrival, agreeable to OT interventions.    Pt is PROGRESSING toward goals and independence of Self Care this treatment session    Discharge recommendation: Continue to assess Pending Progress    Objective:     ADLs:  Grooming: Modified independent   UE Bathing: Modified independent   LE Bathing: Modified independent   UE Dressing: Modified independent   LE Dressing: Moderate assistance (to doff compression socks and siobhan MEL socks following ADL)  Toileting: Modified independent     Transfers:  Sit to stand: Supervision   Stand to sit: Supervision  Toilet Transfer: Supervision      Assessment:     Assessment: Patient seated in recliner upon arrival, agreeable to OT interventions. Requires MOD A to doff MEL compression stocking and socks prior to shower ADL this date. Compeltes toileting tasks, and shower ADL as noted above. Demos good safety awareness throughtout task AEB appropriate use of fww and grab bars PRN. Requires MOD A to siobhan MEL socks following shower due to MEL foot edema. Remains in recliner upon departure w/ PTA present in room. Does report he would like to d/c home today, but is agreeable to stay until end of week if needed - RN notified. Will continue to address goals and progress pt as able/tolerated.    Exercises:     See Flowsheets    Goals:     Short term goals:  Time Frame for Short Term Goals: 10 days (2025)  Short Term Goal 1: Patient will complete UB/LB dressing tasks Luz Elena to increase safety and independence with tasks. -MET  Short Term Goal 2: Patient will tolerate standing while enaging in functional task/ADL for >5 minutes without rest break or loss of balance to improve safety and endurance for ADL tasks.  Short Term Goal 3: Patient will

## 2025-07-16 PROCEDURE — 6370000000 HC RX 637 (ALT 250 FOR IP): Performed by: INTERNAL MEDICINE

## 2025-07-16 PROCEDURE — 97535 SELF CARE MNGMENT TRAINING: CPT

## 2025-07-16 PROCEDURE — 94669 MECHANICAL CHEST WALL OSCILL: CPT

## 2025-07-16 PROCEDURE — 2700000000 HC OXYGEN THERAPY PER DAY

## 2025-07-16 PROCEDURE — 97110 THERAPEUTIC EXERCISES: CPT

## 2025-07-16 PROCEDURE — 1200000002 HC SEMI PRIVATE SWING BED

## 2025-07-16 PROCEDURE — 94640 AIRWAY INHALATION TREATMENT: CPT

## 2025-07-16 PROCEDURE — 94761 N-INVAS EAR/PLS OXIMETRY MLT: CPT

## 2025-07-16 PROCEDURE — 97530 THERAPEUTIC ACTIVITIES: CPT

## 2025-07-16 PROCEDURE — 97116 GAIT TRAINING THERAPY: CPT

## 2025-07-16 PROCEDURE — 6360000002 HC RX W HCPCS: Performed by: INTERNAL MEDICINE

## 2025-07-16 RX ADMIN — MUPIROCIN: 20 OINTMENT TOPICAL at 07:55

## 2025-07-16 RX ADMIN — SACUBITRIL AND VALSARTAN 0.5 TABLET: 24; 26 TABLET, FILM COATED ORAL at 20:01

## 2025-07-16 RX ADMIN — BUDESONIDE 250 MCG: 0.25 SUSPENSION RESPIRATORY (INHALATION) at 20:20

## 2025-07-16 RX ADMIN — MUPIROCIN: 20 OINTMENT TOPICAL at 20:01

## 2025-07-16 RX ADMIN — APIXABAN 5 MG: 5 TABLET, FILM COATED ORAL at 20:01

## 2025-07-16 RX ADMIN — PSYLLIUM HUSK 1 PACKET: 3.4 POWDER ORAL at 20:07

## 2025-07-16 RX ADMIN — ASPIRIN 81 MG: 81 TABLET, CHEWABLE ORAL at 07:55

## 2025-07-16 RX ADMIN — IPRATROPIUM BROMIDE AND ALBUTEROL SULFATE 1 DOSE: .5; 3 SOLUTION RESPIRATORY (INHALATION) at 09:16

## 2025-07-16 RX ADMIN — ATORVASTATIN CALCIUM 40 MG: 40 TABLET, FILM COATED ORAL at 20:01

## 2025-07-16 RX ADMIN — Medication 400 MG: at 20:01

## 2025-07-16 RX ADMIN — IPRATROPIUM BROMIDE AND ALBUTEROL SULFATE 1 DOSE: .5; 3 SOLUTION RESPIRATORY (INHALATION) at 20:20

## 2025-07-16 RX ADMIN — BUDESONIDE 250 MCG: 0.25 SUSPENSION RESPIRATORY (INHALATION) at 09:16

## 2025-07-16 RX ADMIN — PANTOPRAZOLE SODIUM 40 MG: 40 TABLET, DELAYED RELEASE ORAL at 05:12

## 2025-07-16 RX ADMIN — ALLOPURINOL 100 MG: 100 TABLET ORAL at 07:54

## 2025-07-16 RX ADMIN — AMIODARONE HYDROCHLORIDE 200 MG: 200 TABLET ORAL at 07:55

## 2025-07-16 RX ADMIN — IPRATROPIUM BROMIDE AND ALBUTEROL SULFATE 1 DOSE: .5; 3 SOLUTION RESPIRATORY (INHALATION) at 14:14

## 2025-07-16 RX ADMIN — DIGOXIN 125 MCG: 125 TABLET ORAL at 07:55

## 2025-07-16 RX ADMIN — GABAPENTIN 100 MG: 100 CAPSULE ORAL at 07:54

## 2025-07-16 RX ADMIN — TORSEMIDE 40 MG: 10 TABLET ORAL at 07:54

## 2025-07-16 RX ADMIN — TRAZODONE HYDROCHLORIDE 50 MG: 50 TABLET ORAL at 21:48

## 2025-07-16 RX ADMIN — METOPROLOL SUCCINATE 25 MG: 25 TABLET, EXTENDED RELEASE ORAL at 20:01

## 2025-07-16 RX ADMIN — SACUBITRIL AND VALSARTAN 0.5 TABLET: 24; 26 TABLET, FILM COATED ORAL at 07:54

## 2025-07-16 RX ADMIN — EMPAGLIFLOZIN 10 MG: 10 TABLET, FILM COATED ORAL at 07:55

## 2025-07-16 RX ADMIN — OXYCODONE 5 MG: 5 TABLET ORAL at 21:48

## 2025-07-16 RX ADMIN — APIXABAN 5 MG: 5 TABLET, FILM COATED ORAL at 07:54

## 2025-07-16 RX ADMIN — GABAPENTIN 100 MG: 100 CAPSULE ORAL at 20:02

## 2025-07-16 ASSESSMENT — PAIN SCALES - GENERAL
PAINLEVEL_OUTOF10: 0
PAINLEVEL_OUTOF10: 7
PAINLEVEL_OUTOF10: 6

## 2025-07-16 ASSESSMENT — PAIN DESCRIPTION - DESCRIPTORS: DESCRIPTORS: GNAWING;STABBING

## 2025-07-16 ASSESSMENT — PAIN DESCRIPTION - LOCATION: LOCATION: FOOT;CHEST

## 2025-07-16 ASSESSMENT — PAIN DESCRIPTION - ORIENTATION: ORIENTATION: LEFT

## 2025-07-16 NOTE — PROGRESS NOTES
[facility]  Occupational Therapy    Date: 2025  Patient Name: Mike Hensley        : 1955       [] Pt Refusal           [x] Pt Unavailable due to:  Attempted to see patient at 1017, patient unavailable due to working with PTA. Will check back this AM.        RANDI Boss  Date: 2025

## 2025-07-16 NOTE — PLAN OF CARE
Problem: Occupational Therapy - Adult  Goal: By Discharge: Performs self-care activities at highest level of function for planned discharge setting.  See evaluation for individualized goals.  Outcome: Adequate for Discharge

## 2025-07-16 NOTE — PROGRESS NOTES
[facility]  Phone: [phone]    Occupational Therapy Skilled Daily Note  Date: 2025  Patient Name: Mike Hensley        MRN: [id number]    : 1955  (70 y.o.)    Subjective:     Subjective: Patient seated in recliner upon arrival, agreeable to OT interventions.    Pt is PROGRESSING toward goals and independence of Self Care this treatment session    Discharge recommendation: Continue to assess Pending Progress    Objective:     ADLs:  Grooming: Modified independent   UE Bathing: Modified independent   LE Bathing: Modified independent   UE Dressing: Modified independent       Transfers:  Sit to stand: Modified independent   Stand to sit: Modified independent    Assessment:     Assessment: Patient seated in recliner upon arrival, reports he would like a sponge bath. Offered to assist with a shower, declines but agreeable to sponge bath in BR at sink. Patient requires the documented above assistance levels for ADL tasks and functional transfers. Noted reddened area on anterior L shin this date post ADL - RN notified. Declines UB ex at this time, remains in recliner upon DARRON departure w/ call light and other personal items within reach. Will continue to address goals and progress pt as able/tolerated.    Exercises:     See Flowsheets    Goals:     Short term goals:  Time Frame for Short Term Goals: 10 days (2025)  Short Term Goal 1: Patient will complete UB/LB dressing tasks Luz Elena to increase safety and independence with tasks. -MET  Short Term Goal 2: Patient will tolerate standing while enaging in functional task/ADL for >5 minutes without rest break or loss of balance to improve safety and endurance for ADL tasks.  Short Term Goal 3: Patient will complete toileting routine Luz Elena to increase safety and independence for return home. -MET  Short Term Goal 4: Patient will complete bathing, oral care, and grooming tasks with SUP for improved independence. -PARTIALLY MET  Short Term Goal 5:

## 2025-07-16 NOTE — PROGRESS NOTES
Phone: [phone] [facility]  Date: 2025  Fax: [phone]      Physical Therapy    Daily Note    Patient Name: Mike Hensley      : 1955  (70 y.o.)  MRN: [id number]      Assessment                     Sit to Stand: Modified independent  Stand to Sit: Modified independent                WB Status: gait with wheeled walker ~65 ftx1 with w/c follow and supervision/mod I                Assessment: Patient in chair after eating lunch. He agrees to work with PTA. Sit to stand from chair is mod I. Ambulates with wheeled walker ~65 ftx1 with supervsion/mod I. Wheeled remaining distance to therapy room and completes exercises as outlined above. Remains in therapy room in w/c to work with DARRON.  Safety Devices  Type of Devices: Gait belt, Left in chair (in w/c in therapy room to work with DARRON)          Use of Gait belt, Other Staff Present  , and Left in chair in w/c in therapy room to work with DARRON  Time In: 1300  Time Out: 1330  Timed Coded Minutes: 30  Total Treatment Time: 30       Exercises:  See Flowsheets    Plan  Cont Per Plan Of Care    Goals  Short Term Goals     Short Term Goal 1: STG=LTG                Long Term Goals  Time Frame for Long Term Goals : 7 days - expires 25  Long Term Goal 1: Complete basic transfers independently to return home safely  Long Term Goal 2: Ambulate with wh walker or least assistive device x 100ft with supervision to safely negotiate home environment  Long Term Goal 3: Good endurance to complete above ambulation and self-care tasks to reduce falls risk associated with fatigue  Long Term Goal 4: Good dynamic standing balance to complete self-care tasks-MET       Shagufta Benson Therapy License Number: [id number]    Date: 2025

## 2025-07-16 NOTE — PLAN OF CARE
Problem: Chronic Conditions and Co-morbidities  Goal: Patient's chronic conditions and co-morbidity symptoms are monitored and maintained or improved  7/16/2025 0901 by Alice Sutton RN  Outcome: Progressing  7/16/2025 0221 by Sandro Pina RN  Outcome: Progressing     Problem: Discharge Planning  Goal: Discharge to home or other facility with appropriate resources  7/16/2025 0901 by Alice Sutton RN  Outcome: Progressing  7/16/2025 0221 by Sandro Pina RN  Outcome: Progressing     Problem: Pain  Goal: Verbalizes/displays adequate comfort level or baseline comfort level  7/16/2025 0901 by Alice Sutton RN  Outcome: Progressing  7/16/2025 0221 by Sandro Pina RN  Outcome: Progressing     Problem: Safety - Adult  Goal: Free from fall injury  7/16/2025 0901 by Alice Sutton RN  Outcome: Progressing  7/16/2025 0221 by Sandro Pina RN  Outcome: Progressing     Problem: ABCDS Injury Assessment  Goal: Absence of physical injury  7/16/2025 0901 by Alice Sutton RN  Outcome: Progressing  7/16/2025 0221 by Sandro Pina RN  Outcome: Progressing     Problem: Cardiovascular - Adult  Goal: Maintains optimal cardiac output and hemodynamic stability  7/16/2025 0901 by Alice Sutton RN  Outcome: Progressing  7/16/2025 0221 by Sandro Pina RN  Outcome: Progressing     Problem: Skin/Tissue Integrity - Adult  Goal: Incisions, wounds, or drain sites healing without S/S of infection  7/16/2025 0901 by Alice Sutton RN  Outcome: Progressing  7/16/2025 0221 by Sandro Pina RN  Outcome: Progressing     Problem: Gastrointestinal - Adult  Goal: Minimal or absence of nausea and vomiting  7/16/2025 0901 by Alice Sutton RN  Outcome: Progressing  7/16/2025 0221 by Sandro Pina RN  Outcome: Progressing     Problem: Nutrition Deficit:  Goal: Optimize nutritional status  7/16/2025 0901 by Alice Sutton RN  Outcome: Progressing  7/16/2025 0221 by Sandro Pina RN  Outcome: Progressing     Problem: Neurosensory -

## 2025-07-16 NOTE — PROGRESS NOTES
[facility]  Phone: [phone]    Occupational Therapy Skilled Daily Note  Date: 2025  Patient Name: Mike Hensley        MRN: [id number]    : 1955  (70 y.o.)    Subjective:     Subjective: Patient seated in therapy room upon arrival, agreeable to OT interventions.    Pt is PROGRESSING toward goals and independence of Self Care this treatment session    Discharge recommendation: Continue to assess Pending Progress    Objective:     Transfers:  Sit to stand: Modified independent   Stand to sit: Modified independent      Assessment:     Assessment: Patient seated in therapy room upon arrival, finishing with PTA. Patient agreeable to OT interventions at this time. Engages in dynamic standing balance tasks to retrieve items from floor level using AE (reacher), reaching outside RANDA with single UE, or dynamic reaching w/o support on AD. Engages in standing task for 2 min 41 seconds, 1 min 21 seconds, and 2 min 37 seconds - no LOB is noted. Patient requires extended rest periods between standing tasks - O2 remains WFL on room air. Wishes to return to room following standing tasks due to fatigue. Ambulates ~65 feet back to room with SBA. Remains in recliner upon DARRON departure with call light and other personal items within reach. RN notified, will continue to address goals and progress pt as able/tolerated.    Exercises:     See Flowsheets    Goals:     Short term goals:  Time Frame for Short Term Goals: 10 days (2025)  Short Term Goal 1: Patient will complete UB/LB dressing tasks Luz Elena to increase safety and independence with tasks. -MET  Short Term Goal 2: Patient will tolerate standing while enaging in functional task/ADL for >5 minutes without rest break or loss of balance to improve safety and endurance for ADL tasks. -NOT MET  Short Term Goal 3: Patient will complete toileting routine Luz Elena to increase safety and independence for return home. -MET  Short Term Goal 4: Patient will complete

## 2025-07-16 NOTE — PROGRESS NOTES
Phone: [phone] [facility]  Date: 2025  Fax: [phone]      Physical Therapy    Daily Note    Patient Name: Mike Hensley      : 1955  (70 y.o.)  MRN: [id number]      Assessment                     Sit to Stand: Supervision, Modified independent  Stand to Sit: Supervision, Modified independent                WB Status: gait with wheeled walker from chair to bathroom to w/c in hallway with superivsion                Assessment: Patient seated up in chair. Sit to stand from chair is superivsion/mod I. Ambulates with wheeled walker into bathroom. On/off commode with supervision/mod I. Independent with grace care. Demonstrates good standing balance to wash hands at sink. Ambulates to w/c in hallway and is wheeled to therapy room. Completes exercises as outlined above. C/o numbness in his L LE from his knee to his foot during session. By the end of session it is getting better.  Wheeled back to room following. Gait from w/c to back with wheeled walker and supervision/mod I.  Up in chair following with call light in reach.  Safety Devices  Type of Devices: Call light within reach, Gait belt, Left in chair, Nurse notified          Call light in reach, Phone in reach, Use of Gait belt, Nurse Notified, and Left in chair  Time In: 0948  Time Out: 1030  Timed Coded Minutes: 42  Total Treatment Time: 42       Exercises:  See Flowsheets    Plan  Cont Per Plan Of Care    Goals  Short Term Goals     Short Term Goal 1: STG=LTG                Long Term Goals  Time Frame for Long Term Goals : 7 days - expires 25  Long Term Goal 1: Complete basic transfers independently to return home safely  Long Term Goal 2: Ambulate with wh walker or least assistive device x 100ft with supervision to safely negotiate home environment  Long Term Goal 3: Good endurance to complete above ambulation and self-care tasks to reduce falls risk associated with fatigue  Long Term Goal 4: Good dynamic standing balance to  complete self-care tasks-MET       Shagufta Del Rio License Number: [id number]    Date: 7/16/2025

## 2025-07-16 NOTE — PLAN OF CARE
[facility]  Phone: [phone]    Swingbed Occupational Therapy Plan of Care  OT Orders Received and Evaluation Complete    Date: 2025  Patient Name: Mike Hensley        MRN: [id number]    : 1955  (70 y.o.)   Referring Provider: Paul Jean MD   Treatment Diagnosis: generalized weakness    Identified Problem Areas:     Performance deficits / Impairments: Decreased functional mobility , Decreased ADL status, Decreased strength, Decreased balance, Decreased endurance, Decreased coordination     Justification for Skilled Services:     [x] Complete Daily Tasks Safely  [x] Improve Balance   [x] Return to Prior Level of Function  [x] Family/Caregiver Education    [x] Improve UE strength  [x] Patient Education: [x] Adaptive Equipment   [x] Home Exercise Program and Progression    Treatment Plan:          [] Modalities:  [x] Therapeutic Exercise   [x] Therapeutic Activity  [] Splinting:     [] Home Safety Evaluation         [x] ADL Retraining                       [] Muscle Re-education [] Cognitive Retraining            [] Sensory Integration  [x] Patient Education [x] Home Exercise Program [x] Fine Motor Coordination    Goals:     Short term goals:  Time Frame for Short Term Goals: 10 days (2025)  Short Term Goal 1: Patient will complete UB/LB dressing tasks Luz Elena to increase safety and independence with tasks. -MET  Short Term Goal 2: Patient will tolerate standing while enaging in functional task/ADL for >5 minutes without rest break or loss of balance to improve safety and endurance for ADL tasks.  Short Term Goal 3: Patient will complete toileting routine Luz Elena to increase safety and independence for return home. -MET  Short Term Goal 4: Patient will complete bathing, oral care, and grooming tasks with SUP for improved independence. -PARTIALLY MET  Short Term Goal 5: Patient will participate in BUE ROM/strengthening tasks as appropriate to improve independence wtih functional and

## 2025-07-17 PROCEDURE — 94761 N-INVAS EAR/PLS OXIMETRY MLT: CPT

## 2025-07-17 PROCEDURE — 97116 GAIT TRAINING THERAPY: CPT

## 2025-07-17 PROCEDURE — 6370000000 HC RX 637 (ALT 250 FOR IP): Performed by: INTERNAL MEDICINE

## 2025-07-17 PROCEDURE — 97535 SELF CARE MNGMENT TRAINING: CPT

## 2025-07-17 PROCEDURE — 97110 THERAPEUTIC EXERCISES: CPT

## 2025-07-17 PROCEDURE — 1200000002 HC SEMI PRIVATE SWING BED

## 2025-07-17 PROCEDURE — 94640 AIRWAY INHALATION TREATMENT: CPT

## 2025-07-17 PROCEDURE — 6360000002 HC RX W HCPCS: Performed by: INTERNAL MEDICINE

## 2025-07-17 PROCEDURE — 2700000000 HC OXYGEN THERAPY PER DAY

## 2025-07-17 PROCEDURE — 94669 MECHANICAL CHEST WALL OSCILL: CPT

## 2025-07-17 RX ADMIN — ASPIRIN 81 MG: 81 TABLET, CHEWABLE ORAL at 08:45

## 2025-07-17 RX ADMIN — EMPAGLIFLOZIN 10 MG: 10 TABLET, FILM COATED ORAL at 08:45

## 2025-07-17 RX ADMIN — PANTOPRAZOLE SODIUM 40 MG: 40 TABLET, DELAYED RELEASE ORAL at 05:47

## 2025-07-17 RX ADMIN — METOPROLOL SUCCINATE 25 MG: 25 TABLET, EXTENDED RELEASE ORAL at 21:43

## 2025-07-17 RX ADMIN — IPRATROPIUM BROMIDE AND ALBUTEROL SULFATE 1 DOSE: .5; 3 SOLUTION RESPIRATORY (INHALATION) at 09:04

## 2025-07-17 RX ADMIN — DIGOXIN 125 MCG: 125 TABLET ORAL at 08:45

## 2025-07-17 RX ADMIN — AMIODARONE HYDROCHLORIDE 200 MG: 200 TABLET ORAL at 08:45

## 2025-07-17 RX ADMIN — MUPIROCIN: 20 OINTMENT TOPICAL at 21:42

## 2025-07-17 RX ADMIN — APIXABAN 5 MG: 5 TABLET, FILM COATED ORAL at 21:43

## 2025-07-17 RX ADMIN — IPRATROPIUM BROMIDE AND ALBUTEROL SULFATE 1 DOSE: .5; 3 SOLUTION RESPIRATORY (INHALATION) at 20:17

## 2025-07-17 RX ADMIN — MUPIROCIN: 20 OINTMENT TOPICAL at 08:47

## 2025-07-17 RX ADMIN — BUDESONIDE 250 MCG: 0.25 SUSPENSION RESPIRATORY (INHALATION) at 20:17

## 2025-07-17 RX ADMIN — ALLOPURINOL 100 MG: 100 TABLET ORAL at 08:45

## 2025-07-17 RX ADMIN — TRAZODONE HYDROCHLORIDE 50 MG: 50 TABLET ORAL at 21:43

## 2025-07-17 RX ADMIN — IPRATROPIUM BROMIDE AND ALBUTEROL SULFATE 1 DOSE: .5; 3 SOLUTION RESPIRATORY (INHALATION) at 13:54

## 2025-07-17 RX ADMIN — METOPROLOL SUCCINATE 25 MG: 25 TABLET, EXTENDED RELEASE ORAL at 08:45

## 2025-07-17 RX ADMIN — TORSEMIDE 40 MG: 10 TABLET ORAL at 08:45

## 2025-07-17 RX ADMIN — ATORVASTATIN CALCIUM 40 MG: 40 TABLET, FILM COATED ORAL at 21:44

## 2025-07-17 RX ADMIN — ACETAMINOPHEN 650 MG: 325 TABLET ORAL at 21:42

## 2025-07-17 RX ADMIN — PSYLLIUM HUSK 1 PACKET: 3.4 POWDER ORAL at 21:42

## 2025-07-17 RX ADMIN — Medication 400 MG: at 21:44

## 2025-07-17 RX ADMIN — BUDESONIDE 250 MCG: 0.25 SUSPENSION RESPIRATORY (INHALATION) at 09:10

## 2025-07-17 RX ADMIN — APIXABAN 5 MG: 5 TABLET, FILM COATED ORAL at 08:45

## 2025-07-17 RX ADMIN — GABAPENTIN 100 MG: 100 CAPSULE ORAL at 08:45

## 2025-07-17 RX ADMIN — SACUBITRIL AND VALSARTAN 0.5 TABLET: 24; 26 TABLET, FILM COATED ORAL at 08:45

## 2025-07-17 RX ADMIN — SACUBITRIL AND VALSARTAN 0.5 TABLET: 24; 26 TABLET, FILM COATED ORAL at 21:43

## 2025-07-17 ASSESSMENT — PAIN SCALES - GENERAL
PAINLEVEL_OUTOF10: 0
PAINLEVEL_OUTOF10: 6
PAINLEVEL_OUTOF10: 3

## 2025-07-17 ASSESSMENT — PAIN DESCRIPTION - ORIENTATION: ORIENTATION: LEFT;MID

## 2025-07-17 ASSESSMENT — PAIN DESCRIPTION - DESCRIPTORS: DESCRIPTORS: STABBING;ACHING

## 2025-07-17 NOTE — PLAN OF CARE
Problem: Nutrition Deficit:  Goal: Optimize nutritional status  7/17/2025 1513 by Shagufta Benson  Outcome: Adequate for Discharge  7/17/2025 0911 by Jerod Vo, RN  Outcome: Progressing

## 2025-07-17 NOTE — PROGRESS NOTES
Spiritual Services Interventions  0271/0271-01   7/17/2025  Sr Sybil Hensley  70 y.o. year old male    Encounter Summary  Encounter Overview/Reason: Follow-up  Service Provided For: Patient  Referral/Consult From: Ricki  Last Encounter : 07/17/25  Complexity of Encounter: Low  Begin Time: 1000  End Time : 1015  Total Time Calculated: 15 min     Spiritual/Emotional needs  Type: Spiritual Support                    Assessment/Intervention/Outcome  Assessment: Calm  Intervention: Active listening  Outcome: Expressed Gratitude

## 2025-07-17 NOTE — PROGRESS NOTES
[facility]  Occupational Therapy    Date: 2025  Patient Name: Mike Hensley        : 1955       [x] Pt Refusal: Refuses OT interventions at this time, reports its due to increased fatigue and \"wanting to take a snooze\". Will check back if able.           [] Pt Unavailable due to:          RANDI Boss  Date: 2025

## 2025-07-17 NOTE — PROGRESS NOTES
[facility]  Phone: [phone]    Occupational Therapy Skilled Daily Note  Date: 2025  Patient Name: Mike Hensley        MRN: [id number]    : 1955  (70 y.o.)    Subjective:     Subjective: Patient seated in recliner upon arrival, agreeable to OT interventions.    Pt is PROGRESSING toward goals and independence of Self Care this treatment session    Discharge recommendation: Continue to assess Pending Progress    Objective:     ADLs:  Grooming: Modified independent   Toileting: Modified independent     Transfers:  Sit to stand: Modified independent   Stand to sit: Modified independent  Toilet Transfer: Modified independent      Assessment:     Assessment: Patient seated in recliner upon arrival, agreeable to OT interventions at this time. Reports need to use BR - see above assistance levels. Ambulates from BR to hallway, ambulates ~75 feet to therapy room, wheeled remainder of way. Engages in BUE resisitve exercises within precautions w/ good tolerance. Engages in exercise for 25+ min w/ x3 rest periods. Ambulates from therapy room back to his recliner w/ SUP provided. Remains in recliner w/ call light and other personal items within reach. Declines additional questions or concners at this time. Patient to discharge home tomorrow w/ spouse.    Exercises:     See Flowsheets    Goals:     Short term goals:  Time Frame for Short Term Goals: 10 days (2025)  Short Term Goal 1: Patient will complete UB/LB dressing tasks Luz Elena to increase safety and independence with tasks. -MET  Short Term Goal 2: Patient will tolerate standing while enaging in functional task/ADL for >5 minutes without rest break or loss of balance to improve safety and endurance for ADL tasks. -NOT MET  Short Term Goal 3: Patient will complete toileting routine Luz Elena to increase safety and independence for return home. -MET  Short Term Goal 4: Patient will complete bathing, oral care, and grooming tasks with SUP for improved

## 2025-07-17 NOTE — RT PROTOCOL NOTE
RESPIRATORY ASSESSMENT PROTOCOL                                                                                              Patient Name: Mike Hensley Room#: 0271/0271-01 : 1955     Admitting diagnosis: Weakness [R53.1]       Medical History:   Past Medical History:   Diagnosis Date    Atrial fibrillation (HCC)     CAD (coronary artery disease)     CHF (congestive heart failure) (HCC)     Chronic kidney disease     COPD (chronic obstructive pulmonary disease) (HCC)     ETOH abuse     Hyperlipidemia     Hypertension     Lung nodules     STARR (obstructive sleep apnea)     Peripheral vascular disease     Squamous cell carcinoma of rectum (HCC)     With lymph node resection, 33 Rx of Radiation + Chemo       PATIENT ASSESSMENT    LABORATORY DATA  Hematology:   Lab Results   Component Value Date/Time    WBC 8.3 2025 04:20 AM    RBC 3.75 2025 04:20 AM    HGB 10.9 2025 04:20 AM    HCT 35.2 2025 04:20 AM     2025 04:20 AM     Chemistry:  No results found for: \"PHART\", \"WCN7UBE\", \"PO2ART\", \"Z7BMHVHJ\", \"GNY8MWW\", \"PBEA\", \"NBEA\"    VITALS  Pulse: 75   Respirations: 16  BP: 119/67  SpO2: 92 % O2 Device: None (Room air)  Temp: 97.9 °F (36.6 °C)    SKIN COLOR  [x] Normal  [] Pale  [] Dusky  [] Cyanotic    RESPIRATORY PATTERN  [x] Normal  [] Dyspnea  [] Cheyne-Finley  [] Kussmaul  [] Biots    AMBULATORY  [x] Yes  [] No  [x] With Assistance    PEAK FLOW  Predicted:     Personal Best:            Patient Acuity 0 1 2 3 4 Score   Level of Consciousness (LOC) [x]  Alert & Oriented or Pt normal LOC []  Confused;follows directions []  Confused & uncooper-ative []  Obtunded []  Comatose 0   Respiratory Rate  (RR) [x]  Reg. rate & pattern. 12 - 20 bpm  []  Increased RR. Greater than 20 bpm   []  SOB w/ exertion or RR greater than 24 bpm []  Access- ory muscle use at rest. Abn.  resp. []  SOB at rest.   0   Bilateral Breath Sounds (BBS) []  Clear [x]  Diminish-ed bases  []  Diminish-ed t/o, or

## 2025-07-17 NOTE — PROGRESS NOTES
Phone: [phone] [facility]  Date: 2025  Fax: [phone]      Physical Therapy    Daily Note    Patient Name: Mike Hensley      : 1955  (70 y.o.)  MRN: [id number]      Assessment                     Sit to Stand: Modified independent  Stand to Sit: Modified independent                WB Status: gait with wheeled walker ~ 90 ft x;1 65 ft x1                Assessment: Patient in chair upon arrival to room. Sit to stand from chair is mod I/independent. Ambulates with wheeled walker and mod I/independent to therapy room. This is the first time he has made it the entire way ~90 ft x1.  Completes seated and standing exercises as outlined above with good tolerance. Does note some fatigue but much improved from previous session. Also states his L foot is \"more awake\" today.  Able to ambulate ~65 ft back to room with wheeled walker and mod I/independent.  Up in chair following with call light in reach.  Safety Devices  Type of Devices: Call light within reach, Left in chair          Call light in reach, Phone in reach, Use of Gait belt, and Left in chair  Time In: 1023  Time Out: 1105  Timed Coded Minutes: 42  Total Treatment Time: 42       Exercises:  See Flowsheets    Plan  Cont Per Plan Of Care    Goals  Short Term Goals     Short Term Goal 1: STG=LTG                Long Term Goals  Time Frame for Long Term Goals : 7 days - expires 25  Long Term Goal 1: Complete basic transfers independently to return home safely-MET  Long Term Goal 2: Ambulate with wh walker or least assistive device x 100ft with supervision to safely negotiate home environment-MET  Long Term Goal 3: Good endurance to complete above ambulation and self-care tasks to reduce falls risk associated with fatigue-MET  Long Term Goal 4: Good dynamic standing balance to complete self-care tasks-MET       Shagufta Benson Therapy License Number: [id number]    Date: 2025

## 2025-07-17 NOTE — PROGRESS NOTES
Phone: [phone] [facility]  Date: 2025  Fax: [phone]      Physical Therapy    Daily Note    Patient Name: Mike Hensley      : 1955  (70 y.o.)  MRN: [id number]      Assessment                     Sit to Stand: Modified independent  Stand to Sit: Modified independent                WB Status: gait with wheeled walker ~ ft x1 nad 15 ftx1 mod I                Assessment: Patient seated in chair visiting with girl friend upon arrival to room. Sit to stand from chair is mod I. Ambulates with wheeled walker to therapy room. No LOB noted with gait. Does not slight increase in SOB and fatigue. Completes seated B LE exercises as outlined above followed by standing exercises.  O2 checked following standing exercises and is noted to be 87-89% initally. Recovers to 90-92% after a few minutes of resting.  Wheeled back to room and is able to ambulate from doorway back to chair with wheeled walker.  Up in chair following with call light in reach. Girl friend remains with patient for full session and following. Nurse updated on O2 sats.  Safety Devices  Type of Devices: Call light within reach, Gait belt, Left in chair, Nurse notified          Call light in reach, Phone in reach, Use of Gait belt, Nurse Notified, Family Present, and Left in chair  Time In: 1413  Time Out: 1500  Timed Coded Minutes: 47  Total Treatment Time: 47       Exercises:  See Flowsheets    Plan  Cont Per Plan Of Care    Goals  Short Term Goals     Short Term Goal 1: STG=LTG                Long Term Goals  Time Frame for Long Term Goals : 7 days - expires 25  Long Term Goal 1: Complete basic transfers independently to return home safely-MET  Long Term Goal 2: Ambulate with wh walker or least assistive device x 100ft with supervision to safely negotiate home environment-MET  Long Term Goal 3: Good endurance to complete above ambulation and self-care tasks to reduce falls risk associated with fatigue-MET  Long Term Goal 4:

## 2025-07-18 VITALS
TEMPERATURE: 97.9 F | SYSTOLIC BLOOD PRESSURE: 132 MMHG | HEART RATE: 61 BPM | WEIGHT: 263.89 LBS | OXYGEN SATURATION: 94 % | RESPIRATION RATE: 18 BRPM | HEIGHT: 72 IN | BODY MASS INDEX: 35.74 KG/M2 | DIASTOLIC BLOOD PRESSURE: 60 MMHG

## 2025-07-18 PROCEDURE — 94669 MECHANICAL CHEST WALL OSCILL: CPT

## 2025-07-18 PROCEDURE — 2700000000 HC OXYGEN THERAPY PER DAY

## 2025-07-18 PROCEDURE — 6370000000 HC RX 637 (ALT 250 FOR IP): Performed by: INTERNAL MEDICINE

## 2025-07-18 PROCEDURE — 94640 AIRWAY INHALATION TREATMENT: CPT

## 2025-07-18 PROCEDURE — 6360000002 HC RX W HCPCS: Performed by: INTERNAL MEDICINE

## 2025-07-18 PROCEDURE — 94761 N-INVAS EAR/PLS OXIMETRY MLT: CPT

## 2025-07-18 RX ORDER — MUPIROCIN 2 %
OINTMENT (GRAM) TOPICAL
Qty: 30 G | Refills: 0 | Status: SHIPPED | OUTPATIENT
Start: 2025-07-18 | End: 2025-07-24

## 2025-07-18 RX ORDER — METOPROLOL SUCCINATE 25 MG/1
25 TABLET, EXTENDED RELEASE ORAL 2 TIMES DAILY
Qty: 60 TABLET | Refills: 3 | Status: SHIPPED | OUTPATIENT
Start: 2025-07-18

## 2025-07-18 RX ORDER — LANOLIN ALCOHOL/MO/W.PET/CERES
400 CREAM (GRAM) TOPICAL NIGHTLY
COMMUNITY
Start: 2025-07-18

## 2025-07-18 RX ORDER — ATORVASTATIN CALCIUM 40 MG/1
40 TABLET, FILM COATED ORAL NIGHTLY
Qty: 30 TABLET | Refills: 3 | Status: SHIPPED | OUTPATIENT
Start: 2025-07-18 | End: 2025-07-18 | Stop reason: HOSPADM

## 2025-07-18 RX ORDER — TRAZODONE HYDROCHLORIDE 50 MG/1
50 TABLET ORAL NIGHTLY
Qty: 30 TABLET | Refills: 1 | Status: SHIPPED | OUTPATIENT
Start: 2025-07-18

## 2025-07-18 RX ORDER — AMIODARONE HYDROCHLORIDE 200 MG/1
200 TABLET ORAL DAILY
Qty: 30 TABLET | Refills: 1 | Status: SHIPPED | OUTPATIENT
Start: 2025-07-18

## 2025-07-18 RX ORDER — ASPIRIN 81 MG/1
81 TABLET, CHEWABLE ORAL DAILY
COMMUNITY
Start: 2025-07-18

## 2025-07-18 RX ORDER — DIGOXIN 125 MCG
125 TABLET ORAL DAILY
Qty: 30 TABLET | Refills: 3 | Status: SHIPPED | OUTPATIENT
Start: 2025-07-18

## 2025-07-18 RX ORDER — SACUBITRIL AND VALSARTAN 24; 26 MG/1; MG/1
0.5 TABLET, FILM COATED ORAL 2 TIMES DAILY
Qty: 30 TABLET | Refills: 3 | Status: SHIPPED | OUTPATIENT
Start: 2025-07-18

## 2025-07-18 RX ADMIN — PANTOPRAZOLE SODIUM 40 MG: 40 TABLET, DELAYED RELEASE ORAL at 05:19

## 2025-07-18 RX ADMIN — DIGOXIN 125 MCG: 125 TABLET ORAL at 08:09

## 2025-07-18 RX ADMIN — METOPROLOL SUCCINATE 25 MG: 25 TABLET, EXTENDED RELEASE ORAL at 08:09

## 2025-07-18 RX ADMIN — BUDESONIDE 250 MCG: 0.25 SUSPENSION RESPIRATORY (INHALATION) at 09:03

## 2025-07-18 RX ADMIN — SACUBITRIL AND VALSARTAN 0.5 TABLET: 24; 26 TABLET, FILM COATED ORAL at 08:09

## 2025-07-18 RX ADMIN — APIXABAN 5 MG: 5 TABLET, FILM COATED ORAL at 08:08

## 2025-07-18 RX ADMIN — MUPIROCIN: 20 OINTMENT TOPICAL at 08:14

## 2025-07-18 RX ADMIN — EMPAGLIFLOZIN 10 MG: 10 TABLET, FILM COATED ORAL at 08:08

## 2025-07-18 RX ADMIN — ASPIRIN 81 MG: 81 TABLET, CHEWABLE ORAL at 08:09

## 2025-07-18 RX ADMIN — TORSEMIDE 40 MG: 10 TABLET ORAL at 08:09

## 2025-07-18 RX ADMIN — ALLOPURINOL 100 MG: 100 TABLET ORAL at 08:08

## 2025-07-18 RX ADMIN — AMIODARONE HYDROCHLORIDE 200 MG: 200 TABLET ORAL at 08:08

## 2025-07-18 RX ADMIN — IPRATROPIUM BROMIDE AND ALBUTEROL SULFATE 1 DOSE: .5; 3 SOLUTION RESPIRATORY (INHALATION) at 08:57

## 2025-07-18 ASSESSMENT — PAIN SCALES - GENERAL
PAINLEVEL_OUTOF10: 0
PAINLEVEL_OUTOF10: 0

## 2025-07-18 NOTE — DISCHARGE INSTR - PHARMACY
STOP clopidogrel (Plavix)  STOP spironolactone (Aldactone)  STOP isosorbide mononitrate (Imdur)  STOP bumetanide (Bumex), replaced by torsemide (Demadex)  STOP carvedilol (Coreg), replaced by metoprolol (Toprol XL)    Continue taking simvastatin and Trelegy Ellipta at previous dose    Your Entresto dosage has been decreased. A new strength has been ordered and you only take 1/2 tablet twice daily now. Separate your old bottle to prevent any mix-ups with the new dosage.

## 2025-07-18 NOTE — PROGRESS NOTES
[facility]                 Inpatient Discharge Medication Education Note                                 Patient admitted for Swing bed    Medications reviewed with the patient include:    amiodarone (CORDARONE) 200 MG tablet    aspirin 81 MG chewable tablet    chlorhexidine (PERIDEX) 0.12 % solution    digoxin (LANOXIN) 125 MCG tablet    magnesium oxide (MAG-OX) 400 (240 Mg) MG tablet    metoprolol succinate (TOPROL XL) 25 MG extended release tablet    mupirocin (BACTROBAN) 2 % ointment    sacubitril-valsartan (ENTRESTO) 24-26 MG per tablet    torsemide 40 MG TABS    traZODone (DESYREL) 50 MG tablet    fluticasone-umeclidin-vilant (TRELEGY ELLIPTA) 100-62.5-25 MCG/ACT AEPB inhaler      Patient education provided when necessary to include potential medication related side effects with acknowledgement of understanding. When speaking with the patient at discharged, it was determined that his prior to admission med list was incomplete and he was ordered medication for his stay off of his [facility] discharge paperwork. He was taking bumetanide, isosorbide mononitrate, Trelegy Ellipta, simvastatin, clopidogrel, carvedilol and spironolactone. Dr Belle was presented with this info and stated ok to continue Trelegy and simvastatin (vs atorvastatin) but wants patient discharged with his current ID med rec. Handouts provided on metoprolol (was on years ago), amiodarone and digoxin. Informed patient of 's orders and he confirmed understanding and had no additional questions.     Warnings added to Pharmacy Rx Navigator:     STOP clopidogrel (Plavix)  STOP spironolactone (Aldactone)  STOP isosorbide mononitrate (Imdur)  STOP bumetanide (Bumex), replaced by torsemide (Demadex)  STOP carvedilol (Coreg), replaced by metoprolol (Toprol XL)    Continue taking simvastatin and Trelegy Ellipta at previous dose    Your Entresto dosage has been decreased. A new strength has been ordered

## 2025-07-18 NOTE — PLAN OF CARE
Problem: Chronic Conditions and Co-morbidities  Goal: Patient's chronic conditions and co-morbidity symptoms are monitored and maintained or improved  7/18/2025 1210 by Marily Koehler RN  Outcome: Completed  7/18/2025 0758 by Marily Koehler RN  Outcome: Progressing     Problem: Discharge Planning  Goal: Discharge to home or other facility with appropriate resources  7/18/2025 1210 by Marily Koehler RN  Outcome: Completed  7/18/2025 0758 by Marily Koehler RN  Outcome: Progressing     Problem: Pain  Goal: Verbalizes/displays adequate comfort level or baseline comfort level  7/18/2025 1210 by Marily Koehler RN  Outcome: Completed  7/18/2025 0758 by Marily Koehler RN  Outcome: Progressing     Problem: Safety - Adult  Goal: Free from fall injury  7/18/2025 1210 by Marily Koehler RN  Outcome: Completed  7/18/2025 0758 by Marily Koehler RN  Outcome: Progressing     Problem: ABCDS Injury Assessment  Goal: Absence of physical injury  7/18/2025 1210 by Marily Koehler RN  Outcome: Completed  7/18/2025 0758 by Marily Koehler RN  Outcome: Progressing     Problem: Cardiovascular - Adult  Goal: Maintains optimal cardiac output and hemodynamic stability  7/18/2025 1210 by Marily Koehler RN  Outcome: Completed  7/18/2025 0758 by Marily Koehler RN  Outcome: Progressing     Problem: Skin/Tissue Integrity - Adult  Goal: Incisions, wounds, or drain sites healing without S/S of infection  7/18/2025 1210 by Marily Koehler RN  Outcome: Completed  7/18/2025 0758 by Marily Koehler RN  Outcome: Progressing     Problem: Gastrointestinal - Adult  Goal: Minimal or absence of nausea and vomiting  7/18/2025 1210 by Marily Koehler RN  Outcome: Completed  7/18/2025 0758 by Marily Koehler RN  Outcome: Progressing     Problem: Nutrition Deficit:  Goal: Optimize nutritional status  7/18/2025 1210 by Marily Koehler RN  Outcome: Completed  7/18/2025 0758 by Marily Koehler, RN  Outcome: Progressing     Problem:  Neurosensory - Adult  Goal: Achieves stable or improved neurological status  7/18/2025 1210 by Marily Koehler RN  Outcome: Completed  7/18/2025 0758 by Marily Koehler RN  Outcome: Progressing     Problem: Respiratory - Adult  Goal: Achieves optimal ventilation and oxygenation  7/18/2025 1210 by Marily Koehler RN  Outcome: Completed  7/18/2025 0758 by Marily Koehler RN  Outcome: Progressing     Problem: Musculoskeletal - Adult  Goal: Return mobility to safest level of function  Outcome: Completed  Goal: Maintain proper alignment of affected body part  Outcome: Completed  Goal: Return ADL status to a safe level of function  Outcome: Completed

## 2025-07-18 NOTE — PROGRESS NOTES
Hospitalist Progress Note  7/18/2025 5:31 AM  Subjective:   Admit Date: 7/10/2025  PCP: Cheyenne Arteaga, APRN - CNP    Interval History: Mike has no complaints this morning.  Right right arm swelling / redness is a lot better.  He is excited about going home today.  No chest pain or increase in SOB.  Appetite good.  Bowels moving and he denies any trouble urinating.     Diet: ADULT DIET; Easy to Chew; Low Sodium (2 gm); 2000 ml  Medications:   Scheduled Meds:   NONFORMULARY (Patient Supplied)   Oral BID    metoprolol succinate  25 mg Oral BID    mupirocin   Topical BID    allopurinol  100 mg Oral Daily    amiodarone  200 mg Oral Daily    aspirin  81 mg Oral Daily    atorvastatin  40 mg Oral Nightly    budesonide  250 mcg Nebulization BID    digoxin  125 mcg Oral Daily    magnesium oxide  400 mg Oral Nightly    pantoprazole  40 mg Oral QAM AC    psyllium husk-aspartame  1 packet Oral BID    sacubitril-valsartan  0.5 tablet Oral BID    torsemide  40 mg Oral Daily    traZODone  50 mg Oral Nightly    apixaban  5 mg Oral BID    empagliflozin  10 mg Oral Daily    ipratropium 0.5 mg-albuterol 2.5 mg  1 Dose Inhalation TID     Continuous Infusions:    Patient's current medications documented, reviewed, and updated.      CBC: No results for input(s): \"WBC\", \"HGB\", \"PLT\" in the last 72 hours.  BMP:  No results for input(s): \"NA\", \"K\", \"CL\", \"CO2\", \"BUN\", \"CREATININE\", \"GLUCOSE\" in the last 72 hours.  Hepatic: No results for input(s): \"AST\", \"ALT\", \"BILITOT\", \"ALKPHOS\" in the last 72 hours.    Invalid input(s): \"ALB\"  Troponin: No results for input(s): \"TROPONINI\" in the last 72 hours.  BNP: No results for input(s): \"BNP\" in the last 72 hours.  Lipids: No results for input(s): \"CHOL\", \"HDL\" in the last 72 hours.    Invalid input(s): \"LDLCALCU\"  INR: No results for input(s): \"INR\" in the last 72 hours.      Objective:   Vitals: BP (!) 112/57   Pulse 59   Temp 98.2 °F (36.8 °C) (Oral)   Resp 18   Ht 1.829 m (6')   Wt 119.7

## 2025-07-18 NOTE — PROGRESS NOTES
Discharge education and AVS provided. All questions answered to the best of my ability with pt and pts significant other. All belongings are with patient. Home medications sent with patient. Pts significant other to transport pt home.

## 2025-07-18 NOTE — DISCHARGE SUMMARY
Hospitalist Discharge Summary    Mike Hensley  :  1955  MRN:  [id number]    Admit date:  7/10/2025  Discharge date:  25    Admitting Physician:  Paul Jean MD    Discharge Diagnoses:    Generalized weakness - admitted to swing bed with PT / OT ordered daily.   S/P CABG x 3 - (LIMA to LAD, SVG as a sequential graft to PDA and OM2) and s/p AVR on 25 at [facility].   H/O Afib / VT - has AICD - patient was difficult to control, received multiple doses of Digoxin, Amiodarone. Cardioversion on . Remained in A-fib with very difficult to control rate and marginal BP. Continue on Amiodarone, Digoxin, Toprol XL, and  Eliquis.   Chronic HFrEF - on Entresto, Torsemide, Jardiance, and Toprol XL.    Left pleural effusion - S/P Thoracentesis on .  Right arm thrombophlebitis after amiodarone infiltration - treated with 7 days of Keflex.    Hyponatremia - improved off Aldactone.  GI bleed -  colonoscopy completed due to blood in stool > polyp in the cecum,Two in the ascending colon, Six polyps in the descending colon. Patient is noted to have radiation to the anal canal/rectum. Polypectomy was not performed during this procedure given these polyps are not the cause of any hematochezia and challenging bowel prep. Repeat colonoscopy would be recommended within 1 year for polypectomy. On Protonix 40 mg at home.    PAD - H/O Iliac stent.  On Aspirin and Statin.     STARR - on BIPAP with O2 bleed in at 2 l/m.    H/O Gout - on Allopurinol.    S/P multiple dental extractions on 25 - Peridex ordered for 14 days.    ICD check on 25 - Multiple clustered events of NSVT noted  with one sustained episode terminated with shock. EP consulted for VT and device recs at time of OHS. Per EP no need for CRTD upgrade at time of cardiac surgery. Can either be referred to EP at [facility] as an outpatient for consideration of this procedure or follow up locally with closer EP's.     Chronic diarrhea - C.diff negative on 7/3.

## 2025-07-18 NOTE — PLAN OF CARE
Problem: Chronic Conditions and Co-morbidities  Goal: Patient's chronic conditions and co-morbidity symptoms are monitored and maintained or improved  7/18/2025 0758 by Marily Koehler RN  Outcome: Progressing  7/17/2025 1958 by Melina Mccarthy RN  Outcome: Progressing     Problem: Discharge Planning  Goal: Discharge to home or other facility with appropriate resources  7/18/2025 0758 by Marily Koehler RN  Outcome: Progressing  7/17/2025 1958 by Melina Mccarthy RN  Outcome: Progressing     Problem: Pain  Goal: Verbalizes/displays adequate comfort level or baseline comfort level  7/18/2025 0758 by Marily Koehler RN  Outcome: Progressing  7/17/2025 1958 by Melina Mccarthy RN  Outcome: Progressing     Problem: Safety - Adult  Goal: Free from fall injury  7/18/2025 0758 by Marily Koehler RN  Outcome: Progressing  7/17/2025 1958 by Melina Mccarthy RN  Outcome: Progressing     Problem: ABCDS Injury Assessment  Goal: Absence of physical injury  7/18/2025 0758 by Marily Koehler RN  Outcome: Progressing  7/17/2025 1958 by Melina Mccarthy RN  Outcome: Progressing     Problem: Cardiovascular - Adult  Goal: Maintains optimal cardiac output and hemodynamic stability  7/18/2025 0758 by Marily Koehler RN  Outcome: Progressing  7/17/2025 1958 by Melina Mccarthy RN  Outcome: Progressing     Problem: Skin/Tissue Integrity - Adult  Goal: Incisions, wounds, or drain sites healing without S/S of infection  7/18/2025 0758 by Marily Koehler RN  Outcome: Progressing  7/17/2025 1958 by Melina Mccarthy RN  Outcome: Progressing     Problem: Gastrointestinal - Adult  Goal: Minimal or absence of nausea and vomiting  7/18/2025 0758 by Marily Koehler RN  Outcome: Progressing  7/17/2025 1958 by Melina Mccarthy RN  Outcome: Progressing     Problem: Nutrition Deficit:  Goal: Optimize nutritional status  7/18/2025 0758 by Marily Koehler RN  Outcome: Progressing  7/17/2025 1958 by Melina Mccarthy, RN  Outcome:

## 2025-07-29 ENCOUNTER — HOSPITAL ENCOUNTER (OUTPATIENT)
Dept: CARDIAC REHAB | Age: 70
Setting detail: THERAPIES SERIES
Discharge: HOME | End: 2025-07-29
Payer: MEDICARE

## 2025-07-29 ASSESSMENT — PATIENT HEALTH QUESTIONNAIRE - PHQ9
SUM OF ALL RESPONSES TO PHQ QUESTIONS 1-9: 4
7. TROUBLE CONCENTRATING ON THINGS, SUCH AS READING THE NEWSPAPER OR WATCHING TELEVISION: NOT AT ALL
2. FEELING DOWN, DEPRESSED OR HOPELESS: NOT AT ALL
6. FEELING BAD ABOUT YOURSELF - OR THAT YOU ARE A FAILURE OR HAVE LET YOURSELF OR YOUR FAMILY DOWN: NOT AT ALL
10. IF YOU CHECKED OFF ANY PROBLEMS, HOW DIFFICULT HAVE THESE PROBLEMS MADE IT FOR YOU TO DO YOUR WORK, TAKE CARE OF THINGS AT HOME, OR GET ALONG WITH OTHER PEOPLE: NOT DIFFICULT AT ALL
SUM OF ALL RESPONSES TO PHQ QUESTIONS 1-9: 4
1. LITTLE INTEREST OR PLEASURE IN DOING THINGS: NOT AT ALL
3. TROUBLE FALLING OR STAYING ASLEEP: NEARLY EVERY DAY
4. FEELING TIRED OR HAVING LITTLE ENERGY: SEVERAL DAYS
9. THOUGHTS THAT YOU WOULD BE BETTER OFF DEAD, OR OF HURTING YOURSELF: NOT AT ALL
5. POOR APPETITE OR OVEREATING: NOT AT ALL
8. MOVING OR SPEAKING SO SLOWLY THAT OTHER PEOPLE COULD HAVE NOTICED. OR THE OPPOSITE, BEING SO FIGETY OR RESTLESS THAT YOU HAVE BEEN MOVING AROUND A LOT MORE THAN USUAL: NOT AT ALL

## 2025-07-29 ASSESSMENT — NEW YORK HEART ASSOCIATION (NYHA) CLASSIFICATION: NYHA FUNCTIONAL CLASS: CLASS II

## 2025-07-29 NOTE — PROGRESS NOTES
Cardiac Rehab Initial History and Assessment    Mike Hensley   1955  [id number]  7/29/2025    Pre-cert Verification: 40$ copay with [employer]    Primary Diagnosis: CABG  Onset: 6/20/25  Living Will: [] Yes   [x] No  On File: [x] Yes   [] No   [] N/A  Durable Power of : [x] Yes   [] No  Pt requests Living Will information.: [] Yes   [x] No    Medical History  Past Medical History:   Diagnosis Date    Atrial fibrillation (HCC)     CAD (coronary artery disease)     CHF (congestive heart failure) (HCC)     Chronic kidney disease     COPD (chronic obstructive pulmonary disease) (HCC)     ETOH abuse     Hyperlipidemia     Hypertension     Lung nodules     STARR (obstructive sleep apnea)     Peripheral vascular disease     Squamous cell carcinoma of rectum (HCC)     With lymph node resection, 33 Rx of Radiation + Chemo     Past Surgical History:   Procedure Laterality Date    CABG WITH AORTIC VALVE REPLACEMENT  06/20/2025    x3    CARDIAC DEFIBRILLATOR PLACEMENT      COLONOSCOPY  06/18/2025    Polyps found- x1 in cecum, x2 in ascending colon and x6 in descending colon. No clipping or polypectomy. Rec. repeat in 1 year for polypectomy.    FEMUR FRACTURE SURGERY      age 20    LEG SURGERY      stents in both legs    PACEMAKER INSERTION         Family History  Family History   Problem Relation Age of Onset    Cancer Mother     Cancer Father     Cancer Sister          Symptoms:  1. Angina   [] None                                  [] Loss of Energy / Fatigue   [] Tightness   [] Shortness of Breath   [] Pressure    [] Nausea   [] Sharp, Stabbing  [] Incision pain   [] Indigestion, Heartburn [] Sweaty    Where was discomfort located? Sternal pain  Precipitating Factors? Sneezing, moving  Relieved by? none    2. Arrhythmia   [] None                                  [] Heart Racing   [] Irregular Beats (skips) [x] Pacer    [x] Atrial Fibrillation  [] AICD    On any Antiarrhythmia Medications?      3. Congestive Heart 
Cardiac Rehabilitation   Physician Order Form    Mike Hensley  1955  [id number]  7/29/2025    [x] Phase 2 ECG Monitored Cardiac Rehabilitation    [] MI   [] Percutaneous Coronary Intervention          [] Other:   [x] CABG  [] Heart Valve Repair/Replaced   [] Stable Angina [] Heart Failure:     Onset Date: 6/20/25                    Cardiac Education Goals: (see individualized treatment plan for specific goals, progression & compliance)    [x] Hypertension [x] Physical Inactivity  [x] Cardiac A&P    [x] Heart Failure [x] Medications                       [] Coping w/ Anxiety / Depression  [] Diabetes  [x] Weight Management [x] Angina  [x] Hyperlipidemia       [x] Home Exercise             [x] Stress Reduction and Relaxation   [x] Medications           [] Smoking Cessation                                                Prescribed Exercise Plan:    Target Hr: 72-84       Duration: 31 - 60 Minutes  Frequency: 3 Days per week  Initial Met Level: 3.6 submax  Limitations: sternal precautions    Modalities:  [x]Treadmill   [x] UBE  [x] Seated Stepper  [x] Rowing Machine  [x] Weights/therabands  [] Total Body Ergometer    Aerobic exercise to total 31-60 minutes. Progressing by 1-2 minutes per week and/or 1-2 levels per week per patient tolerance using various modalities; according to Blank Scale 12-16 and THR  Introduce 8-12 bilateral UE and LE progressive resistance exercises at 1-3 sets per lift, on 2-3 non-consecutive days using weights/ green  therabands AND WTS TO 8-24 # for 8-15 reps to progressive overload by increasing resistance once reps progressed to at least 15 reps on at least 2 occasions                 Per patient symptoms use:  Appropriate ACLS Algorhythm for Cardiac Events.  Nitroglycerine 0.4mg SLq 5mins X 3 for angina pain.  12 lead EKG for c/o chest pain or change in rhythm.  Nasal O2 for SaO2 <90% or symptoms warranted.  Blood sugar monitoring for Hyper/Hypoglycemia symptoms.    Electronically signed 
Information Provided No   Education Portion control;Mindful eating;Tips to support weight loss;Low calorie snack/meal ideas;Tips for dining out;Heart healthy, high calorie nutritious choices   Weight Management Goals   Patient Target Goals 5% weight loss   Patient Treatment Goal To lose 4-6 lb over the course of the program by increasing exercise and decreasing caloric intake.   Goal Status Initial   Other Core Component - Diabetes   Diabetes No   Other Core Component - Lipid Management   Lipids Managed as Core Component No   Not Managed as a Core Component   Date Lipids Drawn   (No fasting lipid panels availible in [facility] or care everywhere.)   Nutrition Intervention   Dietitian Consult No   Staff/Patient Discussion No   Supplemental Nutrition none per pt   Diabetes Education Referral No   Lipid Clinic Referral No   Nutrition Education   Resource Information Provided No   Education Low saturated fat diet;Low sodium diet;Limit added sugars;Overall healthy eating pattern that emphasizes vegetables, fruits, wholegrains, healthy proteins and non-tropical oils;Reduced calorie/portion controlled diet   Nutrition Goals   Patient Target Goals Weight loss of 5-10%   Patient Treatment Goal TO lose 4-6 lbs of weight while in rehab by increasing exercise and decreasing caloric intake   Goal Status Initial   Education   Learning Barrier Vision   Education Schedule Given Yes   Other Core Component - Hypertension   Hypertension Yes   Is BP WDL No   Hypertension Managed as Core Component Yes   Hypertension Management Assessment   Stages of Change Preparation   Resting /68   Post /66   Is BP WDL No   Hypertension Controlled Yes  (slightly out of range)   Symptoms none   Hypertension Management Interventions   BP Medications see epic mar   Demonstrates Medication Compliance Yes   Recent Medication Changes Yes   Patient Monitoring BP at Home Yes   Physician Notified No   Hypertension Management Education   Resource

## 2025-07-31 ENCOUNTER — HOSPITAL ENCOUNTER (OUTPATIENT)
Dept: CARDIAC REHAB | Age: 70
Setting detail: THERAPIES SERIES
Discharge: HOME | End: 2025-07-31
Payer: MEDICARE

## 2025-07-31 PROCEDURE — 93798 PHYS/QHP OP CAR RHAB W/ECG: CPT

## 2025-08-04 ENCOUNTER — HOSPITAL ENCOUNTER (OUTPATIENT)
Dept: CARDIAC REHAB | Age: 70
Setting detail: THERAPIES SERIES
Discharge: HOME | End: 2025-08-04
Payer: MEDICARE

## 2025-08-04 PROCEDURE — 93798 PHYS/QHP OP CAR RHAB W/ECG: CPT

## 2025-08-05 ENCOUNTER — HOSPITAL ENCOUNTER (OUTPATIENT)
Dept: CARDIAC REHAB | Age: 70
Setting detail: THERAPIES SERIES
Discharge: HOME | End: 2025-08-05
Payer: MEDICARE

## 2025-08-05 PROCEDURE — 93798 PHYS/QHP OP CAR RHAB W/ECG: CPT

## 2025-08-07 ENCOUNTER — HOSPITAL ENCOUNTER (OUTPATIENT)
Dept: CARDIAC REHAB | Age: 70
Setting detail: THERAPIES SERIES
Discharge: HOME | End: 2025-08-07
Payer: MEDICARE

## 2025-08-07 PROCEDURE — 93798 PHYS/QHP OP CAR RHAB W/ECG: CPT

## 2025-08-11 ENCOUNTER — HOSPITAL ENCOUNTER (OUTPATIENT)
Dept: CARDIAC REHAB | Age: 70
Setting detail: THERAPIES SERIES
Discharge: HOME | End: 2025-08-11
Payer: MEDICARE

## 2025-08-11 PROCEDURE — 93798 PHYS/QHP OP CAR RHAB W/ECG: CPT

## 2025-08-12 ENCOUNTER — HOSPITAL ENCOUNTER (OUTPATIENT)
Dept: CARDIAC REHAB | Age: 70
Setting detail: THERAPIES SERIES
Discharge: HOME | End: 2025-08-12
Payer: MEDICARE

## 2025-08-12 PROCEDURE — 93798 PHYS/QHP OP CAR RHAB W/ECG: CPT

## 2025-08-14 ENCOUNTER — HOSPITAL ENCOUNTER (OUTPATIENT)
Dept: CARDIAC REHAB | Age: 70
Setting detail: THERAPIES SERIES
Discharge: HOME | End: 2025-08-14
Payer: MEDICARE

## 2025-08-14 PROCEDURE — 93798 PHYS/QHP OP CAR RHAB W/ECG: CPT

## 2025-08-18 ENCOUNTER — HOSPITAL ENCOUNTER (OUTPATIENT)
Dept: CARDIAC REHAB | Age: 70
Setting detail: THERAPIES SERIES
Discharge: HOME | End: 2025-08-18
Payer: MEDICARE

## 2025-08-18 PROCEDURE — 93798 PHYS/QHP OP CAR RHAB W/ECG: CPT

## 2025-08-19 ENCOUNTER — HOSPITAL ENCOUNTER (OUTPATIENT)
Dept: CARDIAC REHAB | Age: 70
Setting detail: THERAPIES SERIES
Discharge: HOME | End: 2025-08-19
Payer: MEDICARE

## 2025-08-19 PROCEDURE — 93798 PHYS/QHP OP CAR RHAB W/ECG: CPT

## 2025-08-21 ENCOUNTER — HOSPITAL ENCOUNTER (OUTPATIENT)
Dept: CARDIAC REHAB | Age: 70
Setting detail: THERAPIES SERIES
Discharge: HOME | End: 2025-08-21
Payer: MEDICARE

## 2025-08-21 PROCEDURE — 93798 PHYS/QHP OP CAR RHAB W/ECG: CPT

## 2025-08-25 ENCOUNTER — HOSPITAL ENCOUNTER (OUTPATIENT)
Dept: CARDIAC REHAB | Age: 70
Setting detail: THERAPIES SERIES
Discharge: HOME | End: 2025-08-25
Payer: MEDICARE

## 2025-08-25 PROCEDURE — 93798 PHYS/QHP OP CAR RHAB W/ECG: CPT

## 2025-08-26 ENCOUNTER — HOSPITAL ENCOUNTER (OUTPATIENT)
Dept: CARDIAC REHAB | Age: 70
Setting detail: THERAPIES SERIES
Discharge: HOME | End: 2025-08-26
Payer: MEDICARE

## 2025-08-26 PROCEDURE — 93798 PHYS/QHP OP CAR RHAB W/ECG: CPT

## 2025-08-26 ASSESSMENT — NEW YORK HEART ASSOCIATION (NYHA) CLASSIFICATION: NYHA FUNCTIONAL CLASS: CLASS II

## 2025-08-28 ENCOUNTER — HOSPITAL ENCOUNTER (OUTPATIENT)
Dept: CARDIAC REHAB | Age: 70
Setting detail: THERAPIES SERIES
Discharge: HOME | End: 2025-08-28
Payer: MEDICARE

## 2025-08-28 PROCEDURE — 93798 PHYS/QHP OP CAR RHAB W/ECG: CPT

## 2025-09-02 ENCOUNTER — HOSPITAL ENCOUNTER (OUTPATIENT)
Dept: CARDIAC REHAB | Age: 70
Setting detail: THERAPIES SERIES
Discharge: HOME | End: 2025-09-02
Payer: MEDICARE

## 2025-09-02 PROCEDURE — 93798 PHYS/QHP OP CAR RHAB W/ECG: CPT

## 2025-09-04 ENCOUNTER — HOSPITAL ENCOUNTER (OUTPATIENT)
Dept: CARDIAC REHAB | Age: 70
Setting detail: THERAPIES SERIES
Discharge: HOME | End: 2025-09-04
Payer: MEDICARE

## 2025-09-04 PROCEDURE — 93798 PHYS/QHP OP CAR RHAB W/ECG: CPT
